# Patient Record
Sex: FEMALE | Employment: FULL TIME | URBAN - METROPOLITAN AREA
[De-identification: names, ages, dates, MRNs, and addresses within clinical notes are randomized per-mention and may not be internally consistent; named-entity substitution may affect disease eponyms.]

---
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
## 2023-01-15 ENCOUNTER — OFFICE VISIT (OUTPATIENT)
Dept: FAMILY MEDICINE | Facility: CLINIC | Age: 18
End: 2023-01-15
Payer: COMMERCIAL

## 2023-01-15 VITALS
RESPIRATION RATE: 16 BRPM | TEMPERATURE: 98.8 F | SYSTOLIC BLOOD PRESSURE: 97 MMHG | OXYGEN SATURATION: 99 % | BODY MASS INDEX: 22.33 KG/M2 | WEIGHT: 130.1 LBS | HEART RATE: 95 BPM | DIASTOLIC BLOOD PRESSURE: 65 MMHG

## 2023-01-15 DIAGNOSIS — H66.001 NON-RECURRENT ACUTE SUPPURATIVE OTITIS MEDIA OF RIGHT EAR WITHOUT SPONTANEOUS RUPTURE OF TYMPANIC MEMBRANE: Primary | ICD-10-CM

## 2023-01-15 DIAGNOSIS — R05.1 ACUTE COUGH: ICD-10-CM

## 2023-01-15 PROCEDURE — 99213 OFFICE O/P EST LOW 20 MIN: CPT | Mod: CS | Performed by: NURSE PRACTITIONER

## 2023-01-15 PROCEDURE — U0005 INFEC AGEN DETEC AMPLI PROBE: HCPCS | Performed by: NURSE PRACTITIONER

## 2023-01-15 PROCEDURE — U0003 INFECTIOUS AGENT DETECTION BY NUCLEIC ACID (DNA OR RNA); SEVERE ACUTE RESPIRATORY SYNDROME CORONAVIRUS 2 (SARS-COV-2) (CORONAVIRUS DISEASE [COVID-19]), AMPLIFIED PROBE TECHNIQUE, MAKING USE OF HIGH THROUGHPUT TECHNOLOGIES AS DESCRIBED BY CMS-2020-01-R: HCPCS | Performed by: NURSE PRACTITIONER

## 2023-01-15 RX ORDER — AMOXICILLIN 875 MG
875 TABLET ORAL 2 TIMES DAILY
Qty: 20 TABLET | Refills: 0 | Status: SHIPPED | OUTPATIENT
Start: 2023-01-15 | End: 2023-01-25

## 2023-01-15 NOTE — PROGRESS NOTES
Patient presents with:  Ear Problem: Woke up this morning to pain and ringing in R ear    Clinical Decision Making: Focused exam positive for right-sided bright red TM with cloudy fluid present, erythemic canal, significant nasal congestion bilaterally, breath sounds clear.  COVID PCR testing pending.    Clinical presentation medical decision making consistent with right ear infection with pending COVID test. Will treat with a course of oral antibiotics.  Encourage the use of consistent OTC Tylenol for relief of pain.  Encouraged increased rest and water hydration.  Discussed when to return for reevaluation symptoms or not improving as discussed, education provided, patient verbalized understanding.      ICD-10-CM    1. Non-recurrent acute suppurative otitis media of right ear without spontaneous rupture of tympanic membrane  H66.001 amoxicillin (AMOXIL) 875 MG tablet      2. Acute cough  R05.1 Symptomatic COVID-19 Virus (Coronavirus) by PCR Nose          Patient Instructions   Your COVID test will result to your MyChart in the next 24 to 48 hours.  Please start antibiotic as prescribed and consider taking extra strength Tylenol every 4 hours as discussed for pain.      HPI: Hoa Ames is a 17 year old female who presents today complaining of cough with rhinorrhea for the past 2 to 3 days, followed by awaking this morning with a sudden right ear pain along with ringing.  Patient reports pain is a 8-9/10 on the numeric pain scale.  Reports taking OTC acetaminophen with limited relief. Unsure of any ill contacts or positive COVID exposures.    History obtained from the patient.  LMP: 1/5/2022, approximately    Problem List:  2022-03: Infection due to 2019 novel coronavirus  2021-10: DNS (deviated nasal septum)  2021-10: Hypertrophy of nasal turbinates  2021-10: Nasal obstruction  2021-09: Stress reaction  2021-07: Depression, unspecified depression type  2021-07: Persistent insomnia  2021-05: Other  insomnia      History reviewed. No pertinent past medical history.    Social History     Tobacco Use     Smoking status: Former     Types: Cigarettes     Quit date: 2021     Years since quittin.0     Smokeless tobacco: Former   Substance Use Topics     Alcohol use: Not Currently     Comment: last drink years ago       Review of Systems   As noted in HPI    Vitals:    01/15/23 1350   BP: 97/65   Pulse: 95   Resp: 16   Temp: 98.8  F (37.1  C)   TempSrc: Oral   SpO2: 99%   Weight: 59 kg (130 lb 1.6 oz)       Physical Exam  Constitutional:       General: She is not in acute distress.     Appearance: She is ill-appearing. She is not diaphoretic.   HENT:      Head: Normocephalic and atraumatic.      Left Ear: Tympanic membrane, ear canal and external ear normal.      Ears:      Comments: Ri landmarks not visualized, ght-sided bright red TM with cloudy fluid present, erythemic canal     Nose: Congestion present. No rhinorrhea.      Mouth/Throat:      Mouth: Mucous membranes are moist.      Pharynx: No oropharyngeal exudate or posterior oropharyngeal erythema.   Cardiovascular:      Rate and Rhythm: Normal rate and regular rhythm.      Pulses: Normal pulses.      Heart sounds: Normal heart sounds.   Pulmonary:      Effort: Pulmonary effort is normal.      Breath sounds: Normal breath sounds.   Musculoskeletal:      Cervical back: Neck supple.   Lymphadenopathy:      Cervical: No cervical adenopathy.   Skin:     General: Skin is warm.      Capillary Refill: Capillary refill takes less than 2 seconds.      Findings: No rash.   Neurological:      Mental Status: She is alert and oriented to person, place, and time.   Psychiatric:         Behavior: Behavior normal.         Labs:  No results found for any visits on 01/15/23.      At the end of the encounter, I discussed results, diagnosis, medications. Discussed red flags for immediate return to clinic/ER, as well as indications for follow up if no improvement. Patient  understood and agreed to plan.     AJAY Michelle CNP

## 2023-01-15 NOTE — PATIENT INSTRUCTIONS
Your COVID test will result to your MyChart in the next 24 to 48 hours.  Please start antibiotic as prescribed and consider taking extra strength Tylenol every 4 hours as discussed for pain.

## 2023-01-16 ENCOUNTER — OFFICE VISIT (OUTPATIENT)
Dept: FAMILY MEDICINE | Facility: CLINIC | Age: 18
End: 2023-01-16
Payer: COMMERCIAL

## 2023-01-16 VITALS
SYSTOLIC BLOOD PRESSURE: 102 MMHG | TEMPERATURE: 98.1 F | WEIGHT: 137.4 LBS | OXYGEN SATURATION: 97 % | DIASTOLIC BLOOD PRESSURE: 68 MMHG | BODY MASS INDEX: 23.58 KG/M2 | RESPIRATION RATE: 16 BRPM | HEART RATE: 92 BPM

## 2023-01-16 DIAGNOSIS — H10.32 ACUTE BACTERIAL CONJUNCTIVITIS OF LEFT EYE: Primary | ICD-10-CM

## 2023-01-16 DIAGNOSIS — J06.9 UPPER RESPIRATORY TRACT INFECTION, UNSPECIFIED TYPE: ICD-10-CM

## 2023-01-16 PROBLEM — I73.00 RAYNAUD DISEASE: Status: ACTIVE | Noted: 2019-12-01

## 2023-01-16 PROBLEM — J34.3 HYPERTROPHY OF NASAL TURBINATES: Status: RESOLVED | Noted: 2021-10-01 | Resolved: 2023-01-16

## 2023-01-16 PROBLEM — F43.10 PTSD (POST-TRAUMATIC STRESS DISORDER): Status: ACTIVE | Noted: 2021-09-15

## 2023-01-16 PROBLEM — J34.89 NASAL OBSTRUCTION: Status: RESOLVED | Noted: 2021-10-01 | Resolved: 2023-01-16

## 2023-01-16 LAB — SARS-COV-2 RNA RESP QL NAA+PROBE: NEGATIVE

## 2023-01-16 PROCEDURE — 99213 OFFICE O/P EST LOW 20 MIN: CPT | Performed by: PHYSICIAN ASSISTANT

## 2023-01-16 RX ORDER — POLYMYXIN B SULFATE AND TRIMETHOPRIM 1; 10000 MG/ML; [USP'U]/ML
1 SOLUTION OPHTHALMIC EVERY 4 HOURS
Qty: 10 ML | Refills: 0 | Status: SHIPPED | OUTPATIENT
Start: 2023-01-16 | End: 2023-01-23

## 2023-01-16 RX ORDER — BENZONATATE 200 MG/1
200 CAPSULE ORAL 3 TIMES DAILY PRN
Qty: 30 CAPSULE | Refills: 0 | Status: SHIPPED | OUTPATIENT
Start: 2023-01-16 | End: 2023-03-01

## 2023-01-16 NOTE — PROGRESS NOTES
Assessment & Plan     Acute bacterial conjunctivitis of left eye  Add polytrim. Continue amoxil discussed warm compresses.  Continue fluids, rest, ibuprofen or tylenol for uri.  Added tessalon for cough. RTC for persistent or worsening sx.     - trimethoprim-polymyxin b (POLYTRIM) 67088-4.1 UNIT/ML-% ophthalmic solution  Dispense: 10 mL; Refill: 0  - benzonatate (TESSALON) 200 MG capsule  Dispense: 30 capsule; Refill: 0    Upper respiratory tract infection, unspecified type    - benzonatate (TESSALON) 200 MG capsule  Dispense: 30 capsule; Refill: 0         No follow-ups on file.    An Joseph PA-C  [facility]    Hugo Camara is a 17 year old female who presents to clinic today for the following health issues:  Chief Complaint   Patient presents with     Eye Problem     X yesterday. Lt eye. Itchy. Red. Burning. Watery.     HPI  Pt presents to urgent care with concerns re: L eye mattering, redness, irritation.  No photphobia, no vision change.  No pain with EOMS.  No fevers.  Mattered shut this am and continues to drain.  No R eye sx.    Was seen for uri, pending covid 19 test and R ear infection yesterday.  Treated with Amoxil.  Has taken 1 dose.        Review of Systems  Constitutional, HEENT, cardiovascular, pulmonary, gi and gu systems are negative, except as otherwise noted.      Objective    /68 (BP Location: Right arm, Patient Position: Sitting, Cuff Size: Adult Regular)   Pulse 92   Temp 98.1  F (36.7  C) (Oral)   Resp 16   Wt 62.3 kg (137 lb 6.4 oz)   SpO2 97%   BMI 23.58 kg/m    Physical Exam   Pt is in no acute distress and appears well  Conjunctiva injected on the L, purulent discharge on the L noted.  PERRLA, EOMI.    Ears patent B:  TM s intact, non-injected on the L.  R is injected and bulging.Nasal mucosa is non-edematous, no discharge.    Pharynx: non erythematous, tonsils non hypertrophied, No exudate   Neck supple: no adenopathy  Lungs:  CTA  Heart: RRR, no murmur, no thrills or heaves   Ext: no edema  Skin: no rashes

## 2023-01-22 ENCOUNTER — MYC MEDICAL ADVICE (OUTPATIENT)
Dept: FAMILY MEDICINE | Facility: CLINIC | Age: 18
End: 2023-01-22
Payer: COMMERCIAL

## 2023-01-24 NOTE — TELEPHONE ENCOUNTER
RN attempted to call patient to relay Dr. Rodriguez's message below.     Patient did not answer. Left message to patient to call the clinic back.       If patient calls back, please relay Dr. Rodriguez's message and assist patient to schedule an appointment.             Evita Segovia RN  [facility]

## 2023-01-24 NOTE — TELEPHONE ENCOUNTER
Patient returns call. Writer relays Dr. Rodriguez message as directed below to patient. Writer assisted to help patient schedule a virtual visit and patient declined to schedule appt. Patient state patient know that patient is lactose intolerance.

## 2023-01-31 ENCOUNTER — TELEPHONE (OUTPATIENT)
Dept: FAMILY MEDICINE | Facility: CLINIC | Age: 18
End: 2023-01-31
Payer: COMMERCIAL

## 2023-01-31 NOTE — TELEPHONE ENCOUNTER
General Call      Reason for Call:  Medical question    What are your questions or concerns:  PT called and not sure who can help her. Pt was seen twice in [facility] in care for being sick with fever and etc. PCP is no longer here and pt have not est. care with any provider either and is aware to est. Care. Pt has not been to work a few time due to her being sick and her workplace needs a proper diagnose to prove pt is sick. Please call and advise, thanks! Routing to [facility] as PCP no longer here.    Date of last appointment with provider: 11/23/2022    Could we send this information to you in [facility] or would you prefer to receive a phone call?:   Patient would prefer a phone call   Okay to leave a detailed message?: Yes at Home number on file [phone] (home)

## 2023-02-01 ENCOUNTER — NURSE TRIAGE (OUTPATIENT)
Dept: NURSING | Facility: CLINIC | Age: 18
End: 2023-02-01
Payer: COMMERCIAL

## 2023-02-01 NOTE — TELEPHONE ENCOUNTER
RN attempted to contact patient, but no answer. Left message on patient's voice mail to call clinic back.    If patient calls back, pleaserelay message below from Dr Zhang. Thanks    Sharonda Moreno RN  [facility]    Dr Zhang  Looks like her last diagnosis on 1/15/23 was otitis media. Not sure what else is the question.

## 2023-02-01 NOTE — TELEPHONE ENCOUNTER
TRIAGE CALL - Is this a 2nd Level Triage? No  Nurse Triage SBAR - Patient  Returning the call    Situation:  Symptoms since last year without a Dx    Dr Zhang: Looks like her last diagnosis on 1/15/23 was otitis media. Not sure what else is the question.  Background:    Sharon Maldonado DO -Family Medicine - was sent to specialist last year   Has been a year of getting sick every month - fever, fatigue, body aches, or vomiting   She wast t know what is going on with her and specialist last year did not help  Last COVID Neg 2 days ago   Assessment:  Symptoms happens once a karthikeyan, currently feeling  She is fatigue  X 1 vomited   Slight headache  Coughing  Temp:   Patient is able to speak in full long sentences without getting short of breath.  Sharon Maldonado DO is not longer at [facility]  Recommended Disposition per protocol: f/ up with a new provider  Transferred to scheduling   Caller encouraged to continue to monitor symptoms, and to watch for worsening or not responding to measures taken. Call back nurse line with any concerns, line available 24/7.  Patient verbalized understanding and agrees with plan    Armida Jackson RN [place] Nurse Advisor,  6:10 PM 2/1/2023

## 2023-02-14 ENCOUNTER — OFFICE VISIT (OUTPATIENT)
Dept: FAMILY MEDICINE | Facility: CLINIC | Age: 18
End: 2023-02-14
Payer: COMMERCIAL

## 2023-02-14 ENCOUNTER — NURSE TRIAGE (OUTPATIENT)
Dept: NURSING | Facility: CLINIC | Age: 18
End: 2023-02-14
Payer: COMMERCIAL

## 2023-02-14 VITALS
WEIGHT: 133.2 LBS | DIASTOLIC BLOOD PRESSURE: 71 MMHG | TEMPERATURE: 98.6 F | BODY MASS INDEX: 22.86 KG/M2 | HEART RATE: 77 BPM | OXYGEN SATURATION: 98 % | RESPIRATION RATE: 16 BRPM | SYSTOLIC BLOOD PRESSURE: 111 MMHG

## 2023-02-14 DIAGNOSIS — D72.828 NEUTROPHILIA: ICD-10-CM

## 2023-02-14 DIAGNOSIS — K11.1 PAROTID GLAND ENLARGEMENT: Primary | ICD-10-CM

## 2023-02-14 LAB
BASOPHILS # BLD AUTO: 0 10E3/UL (ref 0–0.2)
BASOPHILS NFR BLD AUTO: 0 %
EOSINOPHIL # BLD AUTO: 0 10E3/UL (ref 0–0.7)
EOSINOPHIL NFR BLD AUTO: 0 %
ERYTHROCYTE [DISTWIDTH] IN BLOOD BY AUTOMATED COUNT: 13.5 % (ref 10–15)
HCT VFR BLD AUTO: 35.4 % (ref 35–47)
HGB BLD-MCNC: 11.7 G/DL (ref 11.7–15.7)
IMM GRANULOCYTES # BLD: 0 10E3/UL
IMM GRANULOCYTES NFR BLD: 0 %
LYMPHOCYTES # BLD AUTO: 1.4 10E3/UL (ref 0.8–5.3)
LYMPHOCYTES NFR BLD AUTO: 12 %
MCH RBC QN AUTO: 28.9 PG (ref 26.5–33)
MCHC RBC AUTO-ENTMCNC: 33.1 G/DL (ref 31.5–36.5)
MCV RBC AUTO: 87 FL (ref 78–100)
MONOCYTES # BLD AUTO: 0.9 10E3/UL (ref 0–1.3)
MONOCYTES NFR BLD AUTO: 7 %
NEUTROPHILS # BLD AUTO: 9.8 10E3/UL (ref 1.6–8.3)
NEUTROPHILS NFR BLD AUTO: 81 %
PLATELET # BLD AUTO: 316 10E3/UL (ref 150–450)
RBC # BLD AUTO: 4.05 10E6/UL (ref 3.8–5.2)
WBC # BLD AUTO: 12.1 10E3/UL (ref 4–11)

## 2023-02-14 PROCEDURE — 85025 COMPLETE CBC W/AUTO DIFF WBC: CPT | Performed by: PHYSICIAN ASSISTANT

## 2023-02-14 PROCEDURE — 99213 OFFICE O/P EST LOW 20 MIN: CPT | Performed by: PHYSICIAN ASSISTANT

## 2023-02-14 PROCEDURE — 36415 COLL VENOUS BLD VENIPUNCTURE: CPT | Performed by: PHYSICIAN ASSISTANT

## 2023-02-14 RX ORDER — NAPROXEN 500 MG/1
500 TABLET ORAL
Qty: 21 TABLET | Refills: 0 | Status: SHIPPED | OUTPATIENT
Start: 2023-02-14 | End: 2023-03-31

## 2023-02-14 RX ORDER — LEVOFLOXACIN 500 MG/1
500 TABLET, FILM COATED ORAL DAILY
Qty: 10 TABLET | Refills: 0 | Status: SHIPPED | OUTPATIENT
Start: 2023-02-14 | End: 2023-02-24

## 2023-02-14 NOTE — TELEPHONE ENCOUNTER
"Last night the right side of her jaw was hurting. Patient woke up this morning and the right side of her face is not even with the left side of her face. There is numbness on the right side of her face. Patient states that the right side of her face by her jaw and right under her ear is swollen and painful. Patient states that it is more painful than numb.   \"Right side of her jaw is resisting opening and just want to close.\" Patient is able to smile and raise eyebrows evenly.   No difficulty breathing or swallowing. Denies allergic reaction.  Patient is icing her face.  Protocol recommends go to office now.  Care advice given. Patient will present to [facility] now  Sary Lakhani RN   02/14/23 12:22 PM  [facility] Nurse Advisor        Reason for Disposition    Face swelling is painful to touch    Additional Information    Negative: Life-threatening reaction (anaphylaxis) in the past to similar substance (e.g., food, insect bite/sting, chemical, etc.) and < 2 hours since exposure    Negative: Unresponsive, passed out or very weak    Negative: Swollen tongue    Negative: Difficulty breathing or wheezing    Negative: Sounds like a life-threatening emergency to the triager    Negative: Followed an injury to face    Negative: Bee sting and within last 24 hours    Negative: Mosquito bite suspected    Negative: Insect bite suspected    Negative: SEVERE swelling of entire face and < 2 hours since exposure to high-risk allergen (e.g., peanuts, tree nuts, fish, shellfish or 1st dose of drug) and no serious symptoms AND [4] no serious allergic reaction in the past    Negative: Pregnant > 20 weeks    Negative: Postpartum (from 0 to 6 weeks after delivery)    Negative: Fever    Negative: Taking an ACE Inhibitor medication  (e.g., benazepril/LOTENSIN, captopril/CAPOTEN, enalapril/VASOTEC, lisinopril/ZESTRIL)    Negative: Patient sounds very sick or weak to the triager    Negative: SEVERE swelling of the entire face    " Negative: Face swelling began after taking a drug    Negative: Looks infected (e.g., spreading redness, pus)    Negative: Looks like a boil or infected sore    Negative: Painful rash with multiple small blisters grouped together (i.e., dermatomal distribution or 'band' or 'stripe')    Negative: Swelling of both lower legs (i.e., bilateral pedal edema)    Negative: Widespread rash on body    Protocols used: FACE SWELLING-A-OH

## 2023-02-14 NOTE — TELEPHONE ENCOUNTER
transferred call but Pt disconnected before transfer complete.    Triage attempted to call Pt back twice, no answer.    Latia Rehman RN, Nurse Advisor 12:07 PM 2/14/2023

## 2023-02-14 NOTE — PROGRESS NOTES
Parotid gland enlargement  - naproxen (NAPROSYN) 500 MG tablet; Take 1 tablet (500 mg) by mouth 3 times daily (with meals) for 7 days  - CBC with platelets and differential; Future    Encourage plenty of fluids. Encourage saliva production with charley candy.     Rest the affected area as much as possible.  Apply ice for 15-20 minutes intermittently as needed and especially after any offending activity.     Okay to take acetaminophen 500 mg- 2 tabs (Total of 1000 mg) every 8 hrs     Patient was advised to return to clinic if symptoms do not improve in the amount of time specified in the AVS or if symptoms worsen. Patient educated on red flag symptoms and asked to go directly to the ED if symptoms present themselves.    Addendum: Patient's CBC returned with slightly elevated WBC and Neutrophil count. I'd like the patient to start Levoquin today. I've called the patient t notify them.      IVETTE Perez [facility]    Subjective   18 year old who presents to clinic today for the following health issues:    Facial Pain       HPI     Rt face pain donita began yesterday. Slight tingling present. Pain upon touch. Swelling under the right jaw near the base of the ear. Denies injury. Denies any fevers, chills, or body aches. Denies sore throat, cough, congestion, or runny nose. Denies hearing loss or ringing in the ear.     Review of Systems   Review of Systems   See HPI    Objective    Temp: 98.6  F (37  C) Temp src: Oral BP: 111/71 Pulse: 77   Resp: 16 SpO2: 98 %       Physical Exam   Physical Exam  Constitutional:       General: She is not in acute distress.     Appearance: Normal appearance. She is normal weight. She is not ill-appearing, toxic-appearing or diaphoretic.   HENT:      Head: Normocephalic and atraumatic.        Comments: Swelling and tenderness in the area shown above without erythema or warmth.  No cervical lymphadenopathy.  TMJs normal and without pain.     Right Ear: Tympanic  membrane, ear canal and external ear normal. There is no impacted cerumen.      Left Ear: Tympanic membrane, ear canal and external ear normal. There is no impacted cerumen.      Mouth/Throat:      Mouth: Mucous membranes are moist.      Pharynx: Oropharynx is clear. No oropharyngeal exudate or posterior oropharyngeal erythema.   Neurological:      General: No focal deficit present.      Mental Status: She is alert and oriented to person, place, and time. Mental status is at baseline.      Gait: Gait normal.   Psychiatric:         Mood and Affect: Mood normal.         Behavior: Behavior normal.         Thought Content: Thought content normal.         Judgment: Judgment normal.          No results found for this or any previous visit (from the past 24 hour(s)).

## 2023-02-24 ENCOUNTER — NURSE TRIAGE (OUTPATIENT)
Dept: NURSING | Facility: CLINIC | Age: 18
End: 2023-02-24
Payer: COMMERCIAL

## 2023-02-24 NOTE — TELEPHONE ENCOUNTER
Nurse Triage SBAR    Is this a 2nd Level Triage? YES, LICENSED PRACTITIONER REVIEW IS REQUIRED    Situation: Patient calling with ongoing abdominal pain .  Consent: not needed    Background: Pt states ever morning she has been waking up with a  Burning sensation in her stomach area.   States it feels like she did an ab workout even though she didn't.      States this has been going on since last   States the pain increased today which is why she called.     Assessment:   * Pain located:  Below below and to the sides of her bellybutton.   Radiation:  Denies it radiating to chest or back but states it does go above her belly button too.    * Began in 2022.    * Onset was sudden.    * Pain is only present in the morning for a short time and then it goes away within 5-10 minutes. .  Today is different because the pain has not yet gone away. She has had pain for 40 minutes.    Pain Ratin/10   Pt has had this almost every morning since last  ()  Unsure what could be causing this.    Denies chance of pregnancy.   Denies any other sx.        [facility]ocol Recommended Disposition:   Seen in office today.     Recommendation: Advised patient to make an appointment. Transferred to scheduling. . Reviewed concerning symptoms and when to call back.   Pt disconnected the phone call while connecting with scheduling.   states she will call pt back.    Writer did advise pt to be seen in UC today if no same day appts were available at [facility].     Teresa Vásquez RN [place] [facility] 2023 9:37 AM                              Reason for Disposition    MODERATE pain (e.g., interferes with normal activities that comes and goes (cramps) lasts > 24 hours  (Exception: Pain with Vomiting or Diarrhea - see that Protocol.)    Additional Information    Negative: Passed out (i.e., fainted, collapsed and was not responding)    Negative: Shock suspected (e.g., cold/pale/clammy skin, too weak to  stand, low BP, rapid pulse)    Negative: Sounds like a life-threatening emergency to the triager    Negative: Chest pain    Negative: Pain is mainly in upper abdomen (if needed ask: 'is it mainly above the belly button?')    Negative: Abdominal pain and pregnant < 20 weeks    Negative: Abdominal pain and pregnant 20 or more weeks    Negative: SEVERE abdominal pain (e.g., excruciating)    Negative: Vomiting red blood or black (coffee ground) material    Negative: Bloody, black, or tarry bowel movements  (Exception: Chronic-unchanged black-grey bowel movements and is taking iron pills or Pepto-Bismol.)    Negative: Constant abdominal pain lasting > 2 hours    Negative: Vomiting bile (green color)    Negative: Patient sounds very sick or weak to the triager    Negative: Vomiting and abdomen looks much more swollen than usual    Negative: White of the eyes have turned yellow (i.e., jaundice)    Negative: Blood in urine (red, pink, or tea-colored)    Negative: Fever > 103 F (39.4 C)    Negative: Fever > 101 F (38.3 C) and over 60 years of age    Negative: Fever > 100.0 F (37.8 C) and has diabetes mellitus or a weak immune system (e.g., HIV positive, cancer chemotherapy, organ transplant, splenectomy, chronic steroids)    Negative: Fever > 100.0 F (37.8 C) and bedridden (e.g., nursing home patient, stroke, chronic illness, recovering from surgery)    Negative: Pregnant or could be pregnant (i.e., missed last menstrual period)    Protocols used: ABDOMINAL PAIN - FEMALE-A-[place]

## 2023-02-27 ENCOUNTER — OFFICE VISIT (OUTPATIENT)
Dept: FAMILY MEDICINE | Facility: CLINIC | Age: 18
End: 2023-02-27
Payer: COMMERCIAL

## 2023-02-27 VITALS
DIASTOLIC BLOOD PRESSURE: 69 MMHG | TEMPERATURE: 98.2 F | OXYGEN SATURATION: 99 % | SYSTOLIC BLOOD PRESSURE: 106 MMHG | BODY MASS INDEX: 23.17 KG/M2 | WEIGHT: 135 LBS | HEART RATE: 89 BPM | RESPIRATION RATE: 14 BRPM

## 2023-02-27 DIAGNOSIS — F33.0 MILD EPISODE OF RECURRENT MAJOR DEPRESSIVE DISORDER (H): Primary | ICD-10-CM

## 2023-02-27 DIAGNOSIS — D70.9 NEUTROPENIA, UNSPECIFIED TYPE (H): ICD-10-CM

## 2023-02-27 DIAGNOSIS — M24.676: ICD-10-CM

## 2023-02-27 DIAGNOSIS — R94.31 SHORTENED PR INTERVAL: ICD-10-CM

## 2023-02-27 DIAGNOSIS — A68.9 RECURRENT FEVER: ICD-10-CM

## 2023-02-27 PROBLEM — F32.A DEPRESSION, UNSPECIFIED DEPRESSION TYPE: Status: RESOLVED | Noted: 2021-07-01 | Resolved: 2023-02-27

## 2023-02-27 PROBLEM — J34.2 DNS (DEVIATED NASAL SEPTUM): Status: RESOLVED | Noted: 2021-10-01 | Resolved: 2023-02-27

## 2023-02-27 LAB
ALBUMIN SERPL BCG-MCNC: 4.1 G/DL (ref 3.5–5.2)
ALBUMIN UR-MCNC: NEGATIVE MG/DL
ALP SERPL-CCNC: 61 U/L (ref 35–104)
ALT SERPL W P-5'-P-CCNC: 11 U/L (ref 10–35)
ANION GAP SERPL CALCULATED.3IONS-SCNC: 9 MMOL/L (ref 7–15)
APPEARANCE UR: CLEAR
AST SERPL W P-5'-P-CCNC: 28 U/L (ref 10–35)
ATRIAL RATE - MUSE: 67 BPM
BACTERIA #/AREA URNS HPF: ABNORMAL /HPF
BASOPHILS # BLD AUTO: 0 10E3/UL (ref 0–0.2)
BASOPHILS NFR BLD AUTO: 1 %
BILIRUB SERPL-MCNC: 0.4 MG/DL
BILIRUB UR QL STRIP: NEGATIVE
BUN SERPL-MCNC: 14.9 MG/DL (ref 6–20)
CALCIUM SERPL-MCNC: 9.5 MG/DL (ref 8.6–10)
CHLORIDE SERPL-SCNC: 108 MMOL/L (ref 98–107)
COLOR UR AUTO: YELLOW
CREAT SERPL-MCNC: 0.55 MG/DL (ref 0.51–0.95)
CRP SERPL-MCNC: <3 MG/L
DEPRECATED HCO3 PLAS-SCNC: 23 MMOL/L (ref 22–29)
DIASTOLIC BLOOD PRESSURE - MUSE: NORMAL MMHG
EOSINOPHIL # BLD AUTO: 0 10E3/UL (ref 0–0.7)
EOSINOPHIL NFR BLD AUTO: 1 %
ERYTHROCYTE [DISTWIDTH] IN BLOOD BY AUTOMATED COUNT: 13.4 % (ref 10–15)
ERYTHROCYTE [SEDIMENTATION RATE] IN BLOOD BY WESTERGREN METHOD: 31 MM/HR (ref 0–20)
GFR SERPL CREATININE-BSD FRML MDRD: >90 ML/MIN/1.73M2
GLUCOSE SERPL-MCNC: 81 MG/DL (ref 70–99)
GLUCOSE UR STRIP-MCNC: NEGATIVE MG/DL
HCT VFR BLD AUTO: 36.6 % (ref 35–47)
HGB BLD-MCNC: 11.9 G/DL (ref 11.7–15.7)
HGB UR QL STRIP: NEGATIVE
IMM GRANULOCYTES # BLD: 0 10E3/UL
IMM GRANULOCYTES NFR BLD: 0 %
INTERPRETATION ECG - MUSE: NORMAL
KETONES UR STRIP-MCNC: NEGATIVE MG/DL
LEUKOCYTE ESTERASE UR QL STRIP: NEGATIVE
LYMPHOCYTES # BLD AUTO: 1.2 10E3/UL (ref 0.8–5.3)
LYMPHOCYTES NFR BLD AUTO: 40 %
MCH RBC QN AUTO: 28.3 PG (ref 26.5–33)
MCHC RBC AUTO-ENTMCNC: 32.5 G/DL (ref 31.5–36.5)
MCV RBC AUTO: 87 FL (ref 78–100)
MONOCYTES # BLD AUTO: 0.3 10E3/UL (ref 0–1.3)
MONOCYTES NFR BLD AUTO: 10 %
NEUTROPHILS # BLD AUTO: 1.4 10E3/UL (ref 1.6–8.3)
NEUTROPHILS NFR BLD AUTO: 48 %
NITRATE UR QL: NEGATIVE
P AXIS - MUSE: 33 DEGREES
PH UR STRIP: 7 [PH] (ref 5–8)
PLATELET # BLD AUTO: 283 10E3/UL (ref 150–450)
POTASSIUM SERPL-SCNC: 4.1 MMOL/L (ref 3.4–5.3)
PR INTERVAL - MUSE: 106 MS
PROT SERPL-MCNC: 7.6 G/DL (ref 6.4–8.3)
QRS DURATION - MUSE: 76 MS
QT - MUSE: 394 MS
QTC - MUSE: 416 MS
R AXIS - MUSE: 74 DEGREES
RBC # BLD AUTO: 4.2 10E6/UL (ref 3.8–5.2)
RBC #/AREA URNS AUTO: ABNORMAL /HPF
SODIUM SERPL-SCNC: 140 MMOL/L (ref 136–145)
SP GR UR STRIP: 1.02 (ref 1–1.03)
SQUAMOUS #/AREA URNS AUTO: ABNORMAL /LPF
SYSTOLIC BLOOD PRESSURE - MUSE: NORMAL MMHG
T AXIS - MUSE: 45 DEGREES
UROBILINOGEN UR STRIP-ACNC: 0.2 E.U./DL
VENTRICULAR RATE- MUSE: 67 BPM
WBC # BLD AUTO: 2.9 10E3/UL (ref 4–11)
WBC #/AREA URNS AUTO: ABNORMAL /HPF

## 2023-02-27 PROCEDURE — 85730 THROMBOPLASTIN TIME PARTIAL: CPT | Performed by: FAMILY MEDICINE

## 2023-02-27 PROCEDURE — 36415 COLL VENOUS BLD VENIPUNCTURE: CPT | Performed by: FAMILY MEDICINE

## 2023-02-27 PROCEDURE — 93010 ELECTROCARDIOGRAM REPORT: CPT | Performed by: INTERNAL MEDICINE

## 2023-02-27 PROCEDURE — 85610 PROTHROMBIN TIME: CPT | Performed by: FAMILY MEDICINE

## 2023-02-27 PROCEDURE — 81001 URINALYSIS AUTO W/SCOPE: CPT | Performed by: FAMILY MEDICINE

## 2023-02-27 PROCEDURE — 86140 C-REACTIVE PROTEIN: CPT | Performed by: FAMILY MEDICINE

## 2023-02-27 PROCEDURE — 99214 OFFICE O/P EST MOD 30 MIN: CPT | Performed by: FAMILY MEDICINE

## 2023-02-27 PROCEDURE — 85652 RBC SED RATE AUTOMATED: CPT | Performed by: FAMILY MEDICINE

## 2023-02-27 PROCEDURE — 87798 DETECT AGENT NOS DNA AMP: CPT | Mod: 90 | Performed by: FAMILY MEDICINE

## 2023-02-27 PROCEDURE — 86431 RHEUMATOID FACTOR QUANT: CPT | Performed by: FAMILY MEDICINE

## 2023-02-27 PROCEDURE — 86225 DNA ANTIBODY NATIVE: CPT | Performed by: FAMILY MEDICINE

## 2023-02-27 PROCEDURE — 87484 EHRLICHA CHAFFEENSIS AMP PRB: CPT | Mod: 90 | Performed by: FAMILY MEDICINE

## 2023-02-27 PROCEDURE — 99000 SPECIMEN HANDLING OFFICE-LAB: CPT | Performed by: FAMILY MEDICINE

## 2023-02-27 PROCEDURE — 86160 COMPLEMENT ANTIGEN: CPT | Performed by: FAMILY MEDICINE

## 2023-02-27 PROCEDURE — 86038 ANTINUCLEAR ANTIBODIES: CPT | Performed by: FAMILY MEDICINE

## 2023-02-27 PROCEDURE — 93005 ELECTROCARDIOGRAM TRACING: CPT | Performed by: FAMILY MEDICINE

## 2023-02-27 PROCEDURE — 86618 LYME DISEASE ANTIBODY: CPT | Performed by: FAMILY MEDICINE

## 2023-02-27 PROCEDURE — 80053 COMPREHEN METABOLIC PANEL: CPT | Performed by: FAMILY MEDICINE

## 2023-02-27 PROCEDURE — 85025 COMPLETE CBC W/AUTO DIFF WBC: CPT | Performed by: FAMILY MEDICINE

## 2023-02-27 PROCEDURE — 87468 ANAPLSMA PHGCYTOPHLM AMP PRB: CPT | Mod: 90 | Performed by: FAMILY MEDICINE

## 2023-02-27 ASSESSMENT — PATIENT HEALTH QUESTIONNAIRE - PHQ9
SUM OF ALL RESPONSES TO PHQ QUESTIONS 1-9: 0
SUM OF ALL RESPONSES TO PHQ QUESTIONS 1-9: 0
10. IF YOU CHECKED OFF ANY PROBLEMS, HOW DIFFICULT HAVE THESE PROBLEMS MADE IT FOR YOU TO DO YOUR WORK, TAKE CARE OF THINGS AT HOME, OR GET ALONG WITH OTHER PEOPLE: NOT DIFFICULT AT ALL

## 2023-02-27 NOTE — PROGRESS NOTES
[facility]  Phone : none    Assessment/Plan:  Mild episode of recurrent major depressive disorder (H)  Pt notes she is doing okay- just wants to feel better and stop having fevers    Recurrent fever  Unclear cause. Pt notes that at least once a month for several days she will have a fever over 100 and feel terrible.  Workup so far for different symptoms has not shown any obvious issues.  Will continue workup for recurrent fevers including Rheumatologic/SLE, infectious, malignancy, etc.    - REVIEW OF HEALTH MAINTENANCE PROTOCOL ORDERS  - ESR: Erythrocyte sedimentation rate  - CRP, inflammation  - Anti Nuclear Fatimah IgG by IFA with Reflex  - CBC with platelets and differential  - Comprehensive metabolic panel (BMP + Alb, Alk Phos, ALT, AST, Total. Bili, TP)  - INR  - Partial thromboplastin time  - EKG 12-lead, tracing only  - UA with Microscopic reflex to Culture - Clinic Collect  - Lyme Disease Total Abs Bld with Reflex to Confirm CLIA  - Ehrlichia Anaplasma Sp by PCR  - ESR: Erythrocyte sedimentation rate  - CRP, inflammation  - Anti Nuclear Fatmiah IgG by IFA with Reflex  - CBC with platelets and differential  - Comprehensive metabolic panel (BMP + Alb, Alk Phos, ALT, AST, Total. Bili, TP)  - INR  - Partial thromboplastin time  - Lyme Disease Total Abs Bld with Reflex to Confirm CLIA  - Ehrlichia Anaplasma Sp by PCR  - Urine Microscopic  - DNA double stranded antibodies  - Lupus Anticoagulant Panel  - Complement C3  - Complement C4  - Rheumatoid factor    Shortened OK interval  Continue workup as above.      Neutropenia, unspecified type (H)  - Lab Blood Morphology Pathologist Review      No follow-ups on file.        Hugo Camara is a 18 year old accompanied by her self, presenting for the following health issues:  reocurring sicknes (For the last year has a reoccurring cold that consistent of fever, fatigue and vomiting. Negative COVID test at beginning of February.  )    Since Jan2022 has been getting sick every month.    Has been to ER many times  Did follow-up with GI as scheduled- upper endoscopy was normal.   Has been calling in to work sick a lot- would like a diagnosis for something so she doesn't have to get letters for work when she is feeling sick.  Doesn't want to get fired.      Can't go to work sometimes due to fever 100-104.  Getting this every month for 4 days in a row.  No one around her is getting sick- doesn't seem to be contagious. Has mold exposure. Moldy food in home she lived in. Has been living in her mom's basement and coughing a lot but when she would stay at friends house her cough would get better.      Burning sensation in her abd in the morning.      Weight loss- has lost about 15lb in past year.      Not sick in the past week.      Had covid 1/6/2022.    Influenza 2/2022   Has not gotten flu shot this season.       Labs 3/2022 reviewed wnl  Labs 5/2022 reviewed wnl. ua with protein   Labs 6/2022 no proteinura  7/2022 [facility]- no h pylori  9/2022 [facility] liver tests wnl.   11/2023 neg STI screening  1/2023 no covid- AOM, conjunctivitis,   2/2023 WBC 12.1    History of Present Illness       Reason for visit:  I need a diegnosis for what i have kiesha been sick for over a year and i cant just keep calling in every month and expecting my employer to belive me when i dont have a single diegnosis for this condition im in    She eats 2-3 servings of fruits and vegetables daily.She consumes 2 sweetened beverage(s) daily.She exercises with enough effort to increase her heart rate 60 or more minutes per day.  She exercises with enough effort to increase her heart rate 5 days per week.     Today's PHQ-9         PHQ-9 Total Score: 0    PHQ-9 Q9 Thoughts of better off dead/self-harm past 2 weeks :   Not at all    How difficult have these problems made it for you to do your work, take care of things at home, or get along with other people: Not difficult at all          Review of Systems       Objective    /69 (BP Location: Right arm, Patient Position: Sitting, Cuff Size: Adult Regular)   Pulse 89   Temp 98.2  F (36.8  C) (Temporal)   Resp 14   Wt 61.2 kg (135 lb)   LMP 02/26/2023   SpO2 99%   BMI 23.17 kg/m    Body mass index is 23.17 kg/m .  Physical Exam   Wt Readings from Last 3 Encounters:   02/27/23 61.2 kg (135 lb)   02/14/23 60.4 kg (133 lb 3.2 oz)   01/16/23 62.3 kg (137 lb 6.4 oz) (65 %, Z= 0.39)*     * Growth percentiles are based on CDC (Girls, 2-20 Years) data.     Weight 1/24/22- 149lb    Patient's last menstrual period was 02/26/2023.    All normal as below except abnormalities include: pt appears tired and anxious about her symptoms and wanting to get better.   General is a  18 year old sitting comfortably in no apparent distress wearing a mask   HEENT:  TM are clear bilaterally.  Eye exams within normal   Neck: Supple without lymphadenopathy or thyromegally  CV: Regular rate and rhythm S1S2 without rubs, murmurs or gallops,   Lungs: Clear to auscultation bilaterally  Abd:  +BS, soft NT/ND,  No masses or organomegally,   Extremities: Warm, No Edema, 2+ Pedal and radial pulses bilaterally  Skin: No lesions or rashes noted  Neuro: Able to ambulate around the exam room with equal movement, strength and normal coordination of the upper and lower extremeties symmetrically    History summarized from1-2:many clinic and ER visits over past year reviewed  Old Records-1: Outside allergies, meds, problems and immunizations were reconciled as needed from [facility]  Radiology tests reviewed-1: normal pelvic us 9/2022,   Abd/pelvic CT 9/2022- stool/constipation. Benign 1.7cm ovarian cyst and 2cm left ovarian cyst.    8/2022 normal abd us.   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   Lab tests reviewed-1: 3502-5116  Upper GI 8/2022- normal     Varsha Garcia MD

## 2023-02-27 NOTE — LETTER
My Depression Action Plan  Name: Hoa Ames   Date of Birth 2005  Date: 2/27/2023    My doctor: Sharon Maldonado   My clinic: [facility] [address] [place] [id number] [phone]          GREEN    ZONE   Good Control    What it looks like:     Things are going generally well. You have normal ups and downs. You may even feel depressed from time to time, but bad moods usually last less than a day.   What you need to do:  1. Continue to care for yourself (see self care plan)  2. Check your depression survival kit and update it as needed  3. Follow your physician s recommendations including any medication.  4. Do not stop taking medication unless you consult with your physician first.           YELLOW         ZONE Getting Worse    What it looks like:     Depression is starting to interfere with your life.     It may be hard to get out of bed; you may be starting to isolate yourself from others.    Symptoms of depression are starting to last most all day and this has happened for several days.     You may have suicidal thoughts but they are not constant.   What you need to do:     1. Call your care team. Your response to treatment will improve if you keep your care team informed of your progress. Yellow periods are signs an adjustment may need to be made.     2. Continue your self-care.  Just get dressed and ready for the day.  Don't give yourself time to talk yourself out of it.    3. Talk to someone in your support network.    4. Open up your Depression Self-Care Plan/Wellness Kit.           RED    ZONE Medical Alert - Get Help    What it looks like:     Depression is seriously interfering with your life.     You may experience these or other symptoms: You can t get out of bed most days, can t work or engage in other necessary activities, you have trouble taking care of basic hygiene, or basic responsibilities, thoughts of suicide or death that will not go  away, self-injurious behavior.     What you need to do:  1. Call your care team and request a same-day appointment. If they are not available (weekends or after hours) call your local crisis line, emergency room or 911.          Depression Self-Care Plan / Wellness Kit    Many people find that medication and therapy are helpful treatments for managing depression. In addition, making small changes to your everyday life can help to boost your mood and improve your wellbeing. Below are some tips for you to consider. Be sure to talk with your medical provider and/or behavioral health consultant if your symptoms are worsening or not improving.     Sleep   Sleep hygiene  means all of the habits that support good, restful sleep. It includes maintaining a consistent bedtime and wake time, using your bedroom only for sleeping or sex, and keeping the bedroom dark and free of distractions like a computer, smartphone, or television.     Develop a Healthy Routine  Maintain good hygiene. Get out of bed in the morning, make your bed, brush your teeth, take a shower, and get dressed. Don t spend too much time viewing media that makes you feel stressed. Find time to relax each day.    Exercise  Get some form of exercise every day. This will help reduce pain and release endorphins, the  feel good  chemicals in your brain. It can be as simple as just going for a walk or doing some gardening, anything that will get you moving.      Diet  Strive to eat healthy foods, including fruits and vegetables. Drink plenty of water. Avoid excessive sugar, caffeine, alcohol, and other mood-altering substances.     Stay Connected with Others  Stay in touch with friends and family members.    Manage Your Mood  Try deep breathing, massage therapy, biofeedback, or meditation. Take part in fun activities when you can. Try to find something to smile about each day.     Psychotherapy  Be open to working with a therapist if your provider recommends it.      Medication  Be sure to take your medication as prescribed. Most anti-depressants need to be taken every day. It usually takes several weeks for medications to work. Not all medicines work for all people. It is important to follow-up with your provider to make sure you have a treatment plan that is working for you. Do not stop your medication abruptly without first discussing it with your provider.    Crisis Resources   These hotlines are for both adults and children. They and are open 24 hours a day, 7 days a week unless noted otherwise.      National Suicide Prevention Lifeline   988 or 8-061-974-IUJC (7539)      Crisis Text Line    www.crisistextline.org  Text HOME to 636934 from anywhere in the [place], anytime, about any type of crisis. A live, trained crisis counselor will receive the text and respond quickly.      [place] Lifeline for LGBTQ Youth  A national crisis intervention and suicide lifeline for LGBTQ youth under 25. Provides a safe place to talk without judgement. Call 1-206.585.8949; text START to 573575 or visit www.thetrevorproject.org to talk to a trained counselor.      For [facility] crisis numbers, visit the [facility] website at:  https://[place].gov/dhs/people-we-serve/adults/health-care/mental-health/resources/crisis-contacts.jsp

## 2023-02-27 NOTE — LETTER
23    RE: Hoa Ames  : 2005    To Whom It May Concern:    Hoa Ames has had recurrent illness over past year.    We are currently working on diagnosing her.    Please excuse from work as needed.      Please contact me with any questions.     Sincerely,      Electronically signed by:  Varsha Garcia MD

## 2023-02-28 LAB
ANA PAT SER IF-IMP: ABNORMAL
ANA SER QL IF: POSITIVE
ANA TITR SER IF: ABNORMAL {TITER}
APTT PPP: 35 SECONDS (ref 22–38)
B BURGDOR IGG+IGM SER QL: 0.22
INR PPP: 1.02 (ref 0.85–1.15)

## 2023-03-01 PROBLEM — M24.676: Status: ACTIVE | Noted: 2023-03-01

## 2023-03-01 PROBLEM — D70.9 NEUTROPENIA (H): Status: ACTIVE | Noted: 2023-03-01

## 2023-03-01 PROBLEM — R94.31 SHORTENED PR INTERVAL: Status: ACTIVE | Noted: 2023-03-01

## 2023-03-02 DIAGNOSIS — R76.8 ELEVATED RHEUMATOID FACTOR: ICD-10-CM

## 2023-03-02 DIAGNOSIS — A68.9 RECURRENT FEVER: Primary | ICD-10-CM

## 2023-03-02 DIAGNOSIS — D70.9 NEUTROPENIA, UNSPECIFIED TYPE (H): ICD-10-CM

## 2023-03-02 DIAGNOSIS — R76.8 ELEVATED ANTINUCLEAR ANTIBODY (ANA) LEVEL: ICD-10-CM

## 2023-03-02 LAB
A PHAGOCYTOPH DNA BLD QL NAA+PROBE: NOT DETECTED
C3 SERPL-MCNC: 105 MG/DL (ref 81–157)
C4 SERPL-MCNC: 18 MG/DL (ref 13–39)
DSDNA AB SER-ACNC: 0.7 IU/ML
E CHAFFEENSIS DNA BLD QL NAA+PROBE: NOT DETECTED
E EWINGII DNA SPEC QL NAA+PROBE: NOT DETECTED
EHRLICHIA DNA SPEC QL NAA+PROBE: NOT DETECTED
RHEUMATOID FACT SER NEPH-ACNC: 15 IU/ML

## 2023-03-07 ENCOUNTER — TELEPHONE (OUTPATIENT)
Dept: FAMILY MEDICINE | Facility: CLINIC | Age: 18
End: 2023-03-07
Payer: COMMERCIAL

## 2023-03-07 NOTE — TELEPHONE ENCOUNTER
General Call      Reason for Call:  Medical question    What are your questions or concerns:  Pt called in requesting to speak to  regarding test results. Pt states she has gotten calls from different clinics to schedule an appt and isn't sure why and would just like more clarification about her results and her health. Please give her a call back at your earliest convenient.      Date of last appointment with provider: 2/27/23    Could we send this information to you in [employer] or would you prefer to receive a phone call?:   Patient would prefer a phone call   Okay to leave a detailed message?: Yes at Home number on file [phone] (home)

## 2023-03-07 NOTE — TELEPHONE ENCOUNTER
Called patient she wants to know the specifics of what tests tested for what and she is wondering if she has rheumatoid arthritis or lupus she wants to know this before she schedules with rheumatology. She only wants to speak with you at this point.

## 2023-03-08 NOTE — TELEPHONE ENCOUNTER
I have sent pt several [employer] messages about her testing, etc.     She should check on [employer] and send me more questions this way as able.

## 2023-03-16 ENCOUNTER — NURSE TRIAGE (OUTPATIENT)
Dept: NURSING | Facility: CLINIC | Age: 18
End: 2023-03-16
Payer: COMMERCIAL

## 2023-03-16 ENCOUNTER — HOSPITAL ENCOUNTER (EMERGENCY)
Facility: HOSPITAL | Age: 18
Discharge: HOME | End: 2023-03-16
Attending: EMERGENCY MEDICINE | Admitting: EMERGENCY MEDICINE
Payer: COMMERCIAL

## 2023-03-16 VITALS
DIASTOLIC BLOOD PRESSURE: 72 MMHG | BODY MASS INDEX: 23.92 KG/M2 | SYSTOLIC BLOOD PRESSURE: 105 MMHG | WEIGHT: 135 LBS | TEMPERATURE: 98.3 F | OXYGEN SATURATION: 100 % | HEART RATE: 90 BPM | RESPIRATION RATE: 30 BRPM | HEIGHT: 63 IN

## 2023-03-16 DIAGNOSIS — R00.2 PALPITATIONS: ICD-10-CM

## 2023-03-16 DIAGNOSIS — F41.1 ANXIETY REACTION: ICD-10-CM

## 2023-03-16 LAB
ANION GAP SERPL CALCULATED.3IONS-SCNC: 10 MMOL/L (ref 7–15)
BASOPHILS # BLD AUTO: 0 10E3/UL (ref 0–0.2)
BASOPHILS NFR BLD AUTO: 0 %
BUN SERPL-MCNC: 11 MG/DL (ref 6–20)
CALCIUM SERPL-MCNC: 9.4 MG/DL (ref 8.6–10)
CHLORIDE SERPL-SCNC: 104 MMOL/L (ref 98–107)
CREAT SERPL-MCNC: 0.53 MG/DL (ref 0.51–0.95)
DEPRECATED HCO3 PLAS-SCNC: 23 MMOL/L (ref 22–29)
EOSINOPHIL # BLD AUTO: 0 10E3/UL (ref 0–0.7)
EOSINOPHIL NFR BLD AUTO: 0 %
ERYTHROCYTE [DISTWIDTH] IN BLOOD BY AUTOMATED COUNT: 13.3 % (ref 10–15)
GFR SERPL CREATININE-BSD FRML MDRD: >90 ML/MIN/1.73M2
GLUCOSE SERPL-MCNC: 86 MG/DL (ref 70–99)
HCG SERPL QL: NEGATIVE
HCT VFR BLD AUTO: 38.2 % (ref 35–47)
HGB BLD-MCNC: 12.8 G/DL (ref 11.7–15.7)
IMM GRANULOCYTES # BLD: 0 10E3/UL
IMM GRANULOCYTES NFR BLD: 0 %
LYMPHOCYTES # BLD AUTO: 1.1 10E3/UL (ref 0.8–5.3)
LYMPHOCYTES NFR BLD AUTO: 22 %
MAGNESIUM SERPL-MCNC: 1.7 MG/DL (ref 1.7–2.3)
MCH RBC QN AUTO: 28.8 PG (ref 26.5–33)
MCHC RBC AUTO-ENTMCNC: 33.5 G/DL (ref 31.5–36.5)
MCV RBC AUTO: 86 FL (ref 78–100)
MONOCYTES # BLD AUTO: 0.4 10E3/UL (ref 0–1.3)
MONOCYTES NFR BLD AUTO: 8 %
NEUTROPHILS # BLD AUTO: 3.6 10E3/UL (ref 1.6–8.3)
NEUTROPHILS NFR BLD AUTO: 70 %
NRBC # BLD AUTO: 0 10E3/UL
NRBC BLD AUTO-RTO: 0 /100
PLATELET # BLD AUTO: 295 10E3/UL (ref 150–450)
POTASSIUM SERPL-SCNC: 4.1 MMOL/L (ref 3.4–5.3)
RBC # BLD AUTO: 4.44 10E6/UL (ref 3.8–5.2)
SODIUM SERPL-SCNC: 137 MMOL/L (ref 136–145)
TROPONIN T SERPL HS-MCNC: 8 NG/L
TSH SERPL DL<=0.005 MIU/L-ACNC: 1.66 UIU/ML (ref 0.3–4.2)
WBC # BLD AUTO: 5.2 10E3/UL (ref 4–11)

## 2023-03-16 PROCEDURE — 84443 ASSAY THYROID STIM HORMONE: CPT | Performed by: EMERGENCY MEDICINE

## 2023-03-16 PROCEDURE — 80048 BASIC METABOLIC PNL TOTAL CA: CPT | Performed by: EMERGENCY MEDICINE

## 2023-03-16 PROCEDURE — 84703 CHORIONIC GONADOTROPIN ASSAY: CPT | Performed by: EMERGENCY MEDICINE

## 2023-03-16 PROCEDURE — 93005 ELECTROCARDIOGRAM TRACING: CPT | Performed by: EMERGENCY MEDICINE

## 2023-03-16 PROCEDURE — 85025 COMPLETE CBC W/AUTO DIFF WBC: CPT | Performed by: EMERGENCY MEDICINE

## 2023-03-16 PROCEDURE — 83735 ASSAY OF MAGNESIUM: CPT | Performed by: EMERGENCY MEDICINE

## 2023-03-16 PROCEDURE — 36415 COLL VENOUS BLD VENIPUNCTURE: CPT | Performed by: EMERGENCY MEDICINE

## 2023-03-16 PROCEDURE — 84484 ASSAY OF TROPONIN QUANT: CPT | Performed by: EMERGENCY MEDICINE

## 2023-03-16 PROCEDURE — 99284 EMERGENCY DEPT VISIT MOD MDM: CPT

## 2023-03-16 RX ORDER — HYDROXYZINE HYDROCHLORIDE 25 MG/1
25 TABLET, FILM COATED ORAL 3 TIMES DAILY PRN
Qty: 20 TABLET | Refills: 0 | Status: SHIPPED | OUTPATIENT
Start: 2023-03-16 | End: 2023-03-27

## 2023-03-16 ASSESSMENT — ENCOUNTER SYMPTOMS
VOMITING: 0
PALPITATIONS: 1
NERVOUS/ANXIOUS: 1
CHEST TIGHTNESS: 1
SHORTNESS OF BREATH: 1
NAUSEA: 0

## 2023-03-16 ASSESSMENT — ACTIVITIES OF DAILY LIVING (ADL): ADLS_ACUITY_SCORE: 35

## 2023-03-16 NOTE — ED PROVIDER NOTES
EMERGENCY DEPARTMENT ENCOUNTER     NAME: Hoa Ames   AGE: 18 year old female   YOB: 2005   MRN: [id number]   EVALUATION DATE & TIME: 3/16/2023  2:32 PM   PCP: Varsha Garcia     Chief Complaint   Patient presents with     Anxiety   :    FINAL IMPRESSION       1. Palpitations    2. Anxiety reaction           ED COURSE & MEDICAL DECISION MAKING      Pertinent Labs & Imaging studies reviewed. (See chart for details)   18 year old female  presents to the Emergency Department for evaluation of episodes of feeling like she cannot breathe with her heart pounding and feeling scared.  She has a history of previous anxiety and has been under significant stress recently. Initial Vitals Reviewed. Initial exam notable for patient who is now calm with normal vitals, clear lungs.  I did consider something like thyroid dysfunction, electrolyte abnormality, pregnancy, arrhythmia but this sounds clinically most like anxiety reaction.  No arrhythmia is noted on cardiac monitoring here in the ED, EKG is unremarkable with no arrhythmia, and lab work is all negative.  I discussed my suspicion with patient and at this time we are going to do a trial of hydroxyzine for symptoms at home.  She also notes that she does not currently have a primary care doctor so I placed a referral so that she has someone to follow-up with as an outpatient.             2:40 PM I met the patient and performed my initial interview and exam.     At the conclusion of the encounter I discussed the results of all of the tests and the disposition. The questions were answered. The patient or family acknowledged understanding and was agreeable with the care plan.             Medical Decision Making    History:    Supplemental history from: N/A    External Record(s) reviewed: Documented in chart, if applicable. and Outpatient Record: history    Work Up:    Chart documentation includes differential considered and any EKGs or imaging independently  interpreted by provider, where specified.    In additional to work up documented, I considered the following work up: Documented in chart, if applicable.    External consultation:    Discussion of management with another provider: Documented in chart, if applicable    Complicating factors:    Care impacted by chronic illness: Mental Health    Care affected by social determinants of health: Access to Medical Care    Disposition considerations: Discharge. I prescribed additional prescription strength medication(s) as charted. I considered admission, but ultimately discharged patient with reassuring workup.      MEDICATIONS GIVEN IN THE EMERGENCY:   Medications - No data to display   NEW PRESCRIPTIONS STARTED AT TODAY'S ER VISIT   New Prescriptions    No medications on file     ================================================================   HISTORY OF PRESENT ILLNESS       Patient information was obtained from: patient    Use of Intrepreter: N/A  Hoa Ames is a 18 year old female with history of PTSD, depression who presents to the ED for evaluation of anxiety.    The patient reports some heart racing, shortness of breath, chest tightness, and anxiety since last night. She states she was able to sleep for 4 hours, but woke up with her symptoms still present. States they have waxed and waned in severity and are relatively mild at time of evaluation. The patient endorses a history of anxiety and panic attacks, but states she does not get them frequently. She notes she has had increased stress lately. Otherwise, she denies any changes in her sleep or caffeine intake.     Of note, patient does not have a PCP.    ================================================================    REVIEW OF SYSTEMS       Review of Systems   Respiratory: Positive for chest tightness and shortness of breath.    Cardiovascular: Positive for palpitations (heart racing). Negative for chest pain.   Gastrointestinal: Negative for nausea  "and vomiting.   Psychiatric/Behavioral: The patient is nervous/anxious.    All other systems reviewed and are negative.        PAST HISTORY     PAST MEDICAL HISTORY:   History reviewed. No pertinent past medical history.   PAST SURGICAL HISTORY:   History reviewed. No pertinent surgical history.   CURRENT MEDICATIONS:   No current outpatient medications on file.    ALLERGIES:   Allergies   Allergen Reactions     No Known Allergies       FAMILY HISTORY:   Family History   Problem Relation Age of Onset     Suicide Maternal Grandmother      Cancer Other       SOCIAL HISTORY:   Social History     Socioeconomic History     Marital status: Single   Occupational History     Occupation: [employer]   Tobacco Use     Smoking status: Former     Types: Cigarettes     Quit date: 2021     Years since quittin.2     Smokeless tobacco: Former   Substance and Sexual Activity     Alcohol use: Not Currently     Comment: last drink years ago     Drug use: Not Currently     Comment: once tried LSD     Sexual activity: Yes     Partners: Male     Birth control/protection: Condom   Social History Narrative    Working at [email or web address]    Lives with friend and her family        VITALS  Patient Vitals for the past 24 hrs:   BP Temp Pulse Resp SpO2 Height Weight   23 1431 -- -- 99 20 100 % -- --   23 1427 124/62 98.3  F (36.8  C) 108 20 -- 1.6 m (5' 3\") 61.2 kg (135 lb)        ================================================================    PHYSICAL EXAM     VITAL SIGNS: /62   Pulse 99   Temp 98.3  F (36.8  C)   Resp 20   Ht 1.6 m (5' 3\")   Wt 61.2 kg (135 lb)   LMP 2023   SpO2 100%   BMI 23.91 kg/m     Constitutional:  Awake, no acute distress   HENT:  Atraumatic, oropharynx without exudate or erythema, membranes moist  Lymph:  No adenopathy  Eyes: EOM intact, PERRL, no injection  Neck: Supple  Respiratory:  Clear to auscultation bilaterally, no wheezes or crackles   Cardiovascular:  Regular rate and rhythm, " single S1 and S2   GI:  Soft, nontender, nondistended, no rebound or guarding   Musculoskeletal:  Moves all extremities, no lower extremity edema, no deformities    Skin:  Warm, dry  Neurologic:  Alert and oriented x3, no focal deficits noted       ================================================================  LAB       All pertinent labs reviewed and interpreted.   Labs Ordered and Resulted from Time of ED Arrival to Time of ED Departure   BASIC METABOLIC PANEL - Normal       Result Value    Sodium 137      Potassium 4.1      Chloride 104      Carbon Dioxide (CO2) 23      Anion Gap 10      Urea Nitrogen 11.0      Creatinine 0.53      Calcium 9.4      Glucose 86      GFR Estimate >90     TROPONIN T, HIGH SENSITIVITY - Normal    Troponin T, High Sensitivity 8     MAGNESIUM - Normal    Magnesium 1.7     TSH WITH FREE T4 REFLEX - Normal    TSH 1.66     HCG QUALITATIVE PREGNANCY - Normal    hCG Serum Qualitative Negative     CBC WITH PLATELETS AND DIFFERENTIAL    WBC Count 5.2      RBC Count 4.44      Hemoglobin 12.8      Hematocrit 38.2      MCV 86      MCH 28.8      MCHC 33.5      RDW 13.3      Platelet Count 295      % Neutrophils 70      % Lymphocytes 22      % Monocytes 8      % Eosinophils 0      % Basophils 0      % Immature Granulocytes 0      NRBCs per 100 WBC 0      Absolute Neutrophils 3.6      Absolute Lymphocytes 1.1      Absolute Monocytes 0.4      Absolute Eosinophils 0.0      Absolute Basophils 0.0      Absolute Immature Granulocytes 0.0      Absolute NRBCs 0.0          ===============================================================  RADIOLOGY       Reviewed all pertinent imaging. Please see official radiology report.   No orders to display         ================================================================  EKG     EKG reviewed interpreted by me shows sinus rhythm with rate of 83, left axis, QTc 408 with no acute ST or T wave changes no previous to compare    I have independently reviewed and  interpreted the EKG(s) documented above.     ================================================================  PROCEDURES         I, Belen MontenegroKarin, am serving as a scribe to document services personally performed by Dr. Villa based on my observation and the provider's statements to me. I, Nusrat Villa MD attest that Belenny MontenegroKarin is acting in a scribe capacity, has observed my performance of the services and has documented them in accordance with my direction.   Nusrat Villa M.D.   Emergency Medicine   [facility] EMERGENCY DEPARTMENT  [address] [place] [id number]  [phone]  Dept: [phone]      Nusrat Villa MD  03/16/23 0978

## 2023-03-16 NOTE — TELEPHONE ENCOUNTER
Pt calling, having symptoms of SOB, heart palpitations. Comes and goes, last up to hours or a whole day, feels light headed, like passing out.     Triage to call 911/EMS, care advice given. Pt states she will drive self to ED, Encouraged to call for 911/EMS, pt does not have anyone with her. Pt verbalized understanding and will decide. Call back if have more questions/concerns.     Paola Dietz RN, BSN  3/16/2023 at 2:02 PM  [facility]      Reason for Disposition    Difficult to awaken or acting confused (e.g., disoriented, slurred speech)    Additional Information    Negative: Passed out (i.e., lost consciousness, collapsed and was not responding)    Negative: Shock suspected (e.g., cold/pale/clammy skin, too weak to stand, low BP, rapid pulse)    Protocols used: HEART RATE AND HEARTBEAT SILUSSPEK-Y-VR

## 2023-03-16 NOTE — ED TRIAGE NOTES
Patient with history of panic attacks, states last night she felt SOB, anxious, was able to go to sleep for 4 hours, again this am feeling SOB,heart racing

## 2023-03-18 LAB
ATRIAL RATE - MUSE: 83 BPM
DIASTOLIC BLOOD PRESSURE - MUSE: NORMAL MMHG
INTERPRETATION ECG - MUSE: NORMAL
P AXIS - MUSE: 66 DEGREES
PR INTERVAL - MUSE: 118 MS
QRS DURATION - MUSE: 74 MS
QT - MUSE: 348 MS
QTC - MUSE: 408 MS
R AXIS - MUSE: 80 DEGREES
SYSTOLIC BLOOD PRESSURE - MUSE: NORMAL MMHG
T AXIS - MUSE: 66 DEGREES
VENTRICULAR RATE- MUSE: 83 BPM

## 2023-03-19 ENCOUNTER — OFFICE VISIT (OUTPATIENT)
Dept: FAMILY MEDICINE | Facility: CLINIC | Age: 18
End: 2023-03-19
Payer: COMMERCIAL

## 2023-03-19 VITALS
HEART RATE: 92 BPM | BODY MASS INDEX: 23.91 KG/M2 | DIASTOLIC BLOOD PRESSURE: 75 MMHG | WEIGHT: 135 LBS | SYSTOLIC BLOOD PRESSURE: 110 MMHG | RESPIRATION RATE: 14 BRPM | OXYGEN SATURATION: 99 % | TEMPERATURE: 98.7 F

## 2023-03-19 DIAGNOSIS — J36 PERITONSILLAR CELLULITIS: Primary | ICD-10-CM

## 2023-03-19 DIAGNOSIS — R59.0 CERVICAL LYMPHADENOPATHY: ICD-10-CM

## 2023-03-19 LAB
ALBUMIN SERPL-MCNC: 3.9 G/DL (ref 3.5–5)
ALP SERPL-CCNC: 61 U/L (ref 45–120)
ALT SERPL W P-5'-P-CCNC: 16 U/L (ref 0–45)
ANION GAP SERPL CALCULATED.3IONS-SCNC: 8 MMOL/L (ref 5–18)
AST SERPL W P-5'-P-CCNC: 23 U/L (ref 0–40)
BASOPHILS # BLD AUTO: 0 10E3/UL (ref 0–0.2)
BASOPHILS NFR BLD AUTO: 0 %
BILIRUB SERPL-MCNC: 0.5 MG/DL (ref 0–1)
BUN SERPL-MCNC: 9 MG/DL (ref 8–22)
CALCIUM SERPL-MCNC: 9.3 MG/DL (ref 8.5–10.5)
CHLORIDE BLD-SCNC: 107 MMOL/L (ref 98–107)
CO2 SERPL-SCNC: 22 MMOL/L (ref 22–31)
CREAT SERPL-MCNC: 0.62 MG/DL (ref 0.6–1.1)
DEPRECATED S PYO AG THROAT QL EIA: NEGATIVE
EOSINOPHIL # BLD AUTO: 0 10E3/UL (ref 0–0.7)
EOSINOPHIL NFR BLD AUTO: 0 %
ERYTHROCYTE [DISTWIDTH] IN BLOOD BY AUTOMATED COUNT: 13.7 % (ref 10–15)
GFR SERPL CREATININE-BSD FRML MDRD: >90 ML/MIN/1.73M2
GLUCOSE BLD-MCNC: 82 MG/DL (ref 70–125)
GROUP A STREP BY PCR: NOT DETECTED
HCT VFR BLD AUTO: 35.2 % (ref 35–47)
HGB BLD-MCNC: 11.6 G/DL (ref 11.7–15.7)
IMM GRANULOCYTES # BLD: 0 10E3/UL
IMM GRANULOCYTES NFR BLD: 0 %
LYMPHOCYTES # BLD AUTO: 1.2 10E3/UL (ref 0.8–5.3)
LYMPHOCYTES NFR BLD AUTO: 27 %
MCH RBC QN AUTO: 28.6 PG (ref 26.5–33)
MCHC RBC AUTO-ENTMCNC: 33 G/DL (ref 31.5–36.5)
MCV RBC AUTO: 87 FL (ref 78–100)
MONOCYTES # BLD AUTO: 0.5 10E3/UL (ref 0–1.3)
MONOCYTES NFR BLD AUTO: 11 %
MONOCYTES NFR BLD AUTO: NEGATIVE %
NEUTROPHILS # BLD AUTO: 2.7 10E3/UL (ref 1.6–8.3)
NEUTROPHILS NFR BLD AUTO: 62 %
NRBC # BLD AUTO: 0 10E3/UL
NRBC BLD AUTO-RTO: 0 /100
PLATELET # BLD AUTO: 239 10E3/UL (ref 150–450)
POTASSIUM BLD-SCNC: 3.9 MMOL/L (ref 3.5–5)
PROT SERPL-MCNC: 7.7 G/DL (ref 6–8)
RBC # BLD AUTO: 4.06 10E6/UL (ref 3.8–5.2)
RETICS # AUTO: 0.03 10E6/UL (ref 0.01–0.11)
RETICS/RBC NFR AUTO: 0.8 % (ref 0.8–2.7)
SODIUM SERPL-SCNC: 137 MMOL/L (ref 136–145)
WBC # BLD AUTO: 4.5 10E3/UL (ref 4–11)

## 2023-03-19 PROCEDURE — 80053 COMPREHEN METABOLIC PANEL: CPT | Performed by: FAMILY MEDICINE

## 2023-03-19 PROCEDURE — 85730 THROMBOPLASTIN TIME PARTIAL: CPT | Performed by: FAMILY MEDICINE

## 2023-03-19 PROCEDURE — 86308 HETEROPHILE ANTIBODY SCREEN: CPT | Performed by: FAMILY MEDICINE

## 2023-03-19 PROCEDURE — 99215 OFFICE O/P EST HI 40 MIN: CPT | Performed by: FAMILY MEDICINE

## 2023-03-19 PROCEDURE — 36415 COLL VENOUS BLD VENIPUNCTURE: CPT | Performed by: FAMILY MEDICINE

## 2023-03-19 PROCEDURE — 85613 RUSSELL VIPER VENOM DILUTED: CPT | Performed by: FAMILY MEDICINE

## 2023-03-19 PROCEDURE — 87651 STREP A DNA AMP PROBE: CPT | Performed by: FAMILY MEDICINE

## 2023-03-19 PROCEDURE — 85025 COMPLETE CBC W/AUTO DIFF WBC: CPT | Performed by: FAMILY MEDICINE

## 2023-03-19 PROCEDURE — 85045 AUTOMATED RETICULOCYTE COUNT: CPT | Performed by: FAMILY MEDICINE

## 2023-03-19 PROCEDURE — 85390 FIBRINOLYSINS SCREEN I&R: CPT | Performed by: PATHOLOGY

## 2023-03-19 RX ORDER — AMOXICILLIN AND CLAVULANATE POTASSIUM 600; 42.9 MG/5ML; MG/5ML
1000 POWDER, FOR SUSPENSION ORAL 2 TIMES DAILY
Qty: 166 ML | Refills: 0 | Status: SHIPPED | OUTPATIENT
Start: 2023-03-19 | End: 2023-03-19

## 2023-03-19 RX ORDER — AMOXICILLIN AND CLAVULANATE POTASSIUM 600; 42.9 MG/5ML; MG/5ML
1000 POWDER, FOR SUSPENSION ORAL 2 TIMES DAILY
Qty: 166 ML | Refills: 0 | Status: SHIPPED | OUTPATIENT
Start: 2023-03-19

## 2023-03-19 NOTE — PROGRESS NOTES
ASSESSMENT/PLAN:      ICD-10-CM    1. Peritonsillar cellulitis  J36 Streptococcus A Rapid Screen w/Reflex to PCR - Clinic Collect     Group A Streptococcus PCR Throat Swab     amoxicillin-clavulanate (AUGMENTIN-ES) 600-42.9 MG/5ML suspension     DISCONTINUED: amoxicillin-clavulanate (AUGMENTIN-ES) 600-42.9 MG/5ML suspension      2. Cervical lymphadenopathy  R59.0 Mononucleosis screen     CBC with platelets and differential     Comprehensive metabolic panel     Mononucleosis screen     Comprehensive metabolic panel     CANCELED: CBC with platelets and differential    bilateral anterior and posterior chains, tender node on mid right posterior chain which is decreased in size since onset 6 days ago          Reviewed medication instructions and side effects. Follow up if experiences side effects.     I reviewed supportive care, otc meds to use if needed, expected course, and signs of concern.  Follow up as needed or if she does not improve within  1-2 days or if worsens in any way.  Reviewed red flag symptoms and is to go to the ER if experiences any of these.     The use of Dragon/[employer] dictation services may have been used to construct the content in this note; any grammatical or spelling errors are non-intentional. Please contact the author of this note directly if you are in need of any clarification.        Strep results reviewed with patient at time of appointment, she was notified via phone of her negative mono and normal  CMP and CBC.  With negative mono, significant findings of enlarged, erythematous exudative tonsils  opted to treat her peritonsillar cellulitis with Augmentin.          On the day of the encounter, time spend on chart review, patient visit, review of testing, documentation was 40 minutes          Patient Instructions     If unable to swallow your saliva, 1 tonsil is larger than the other, to ER    Follow up as needed or if your symptoms worsen in any way.     Follow up with your  primary care provider or clinic in about 2-3 days if your symptoms do not improve                     Patient presents with:  Lesion: Has bump rt side of neck can feel it when she turns her head had red spot there earlier this week thought bug bite       Subjective     Hoa Ames is a 18 year old female who presents to clinic today for the following health issues:    HPI   Patient with onset of sore throat 7 days ago, able to swallow, able to swallow her saliva  Patient noticed a small raised lump on the right side of her neck just below the ear slightly red in color since that time the area has decreased in size and is no longer red, patient initially thought it was a small pimple and picked at the area which caused the initial onset of redness  No fever, no cough, no wheezing  positive for myalgias, fatigue, no nausea vomiting or diarrhea  No tylenol or ibuprofen for myalgias or other OTC meds  Patient seen in the ER on 3/16/2023 for palpitations with a final diagnosis of anxiety, patient denies any palpitations since that time, heart rate today is 92    No past medical history on file.  Social History     Tobacco Use     Smoking status: Former     Types: Cigarettes     Quit date: 2021     Years since quittin.2     Smokeless tobacco: Former   Substance Use Topics     Alcohol use: Not Currently     Comment: last drink years ago       Current Outpatient Medications   Medication Sig Dispense Refill     amoxicillin-clavulanate (AUGMENTIN-ES) 600-42.9 MG/5ML suspension Take 8.3 mLs (1,000 mg) by mouth 2 times daily 166 mL 0     hydrOXYzine (ATARAX) 25 MG tablet Take 1 tablet (25 mg) by mouth 3 times daily as needed for anxiety (Patient not taking: Reported on 3/19/2023) 20 tablet 0     Allergies   Allergen Reactions     No Known Allergies              ROS are negative, except as otherwise noted HPI      Objective    /75   Pulse 92   Temp 98.7  F (37.1  C) (Oral)   Resp 14   Wt 61.2 kg (135 lb)    LMP 02/26/2023   SpO2 99%   BMI 23.91 kg/m    Body mass index is 23.91 kg/m .  Physical Exam     GENERAL: Fatigued, no acute distress.   HEENT: Diffuse pharyngeal erythema. Tonsils enlarged, erythematous, mild exudate bilateral uvula is midline.  Sclera, lids and conjunctiva are normal.  Nose congestion and ears clear.  NECK: Shotty anterior and posterior chain bilateral adenopathy, BB size or prominent lymph node mid posterior chain on the right  CHEST:  clear, no wheezing or rales. Normal symmetric air entry throughout both lung fields.   HEART:  S1 and S2 normal, no murmurs, clicks, gallops or rubs. Regular rate and rhythm.  NEURO: Alert and oriented x3, normal strength and tone, normal gait  SKIN: No rashes       Diagnostic Test Results:  Labs reviewed in Epic  Results for orders placed or performed in visit on 03/19/23   Mononucleosis screen     Status: Normal   Result Value Ref Range    Mononucleosis Screen Negative Negative   Comprehensive metabolic panel     Status: Normal   Result Value Ref Range    Sodium 137 136 - 145 mmol/L    Potassium 3.9 3.5 - 5.0 mmol/L    Chloride 107 98 - 107 mmol/L    Carbon Dioxide (CO2) 22 22 - 31 mmol/L    Anion Gap 8 5 - 18 mmol/L    Urea Nitrogen 9 8 - 22 mg/dL    Creatinine 0.62 0.60 - 1.10 mg/dL    Calcium 9.3 8.5 - 10.5 mg/dL    Glucose 82 70 - 125 mg/dL    Alkaline Phosphatase 61 45 - 120 U/L    AST 23 0 - 40 U/L    ALT 16 0 - 45 U/L    Protein Total 7.7 6.0 - 8.0 g/dL    Albumin 3.9 3.5 - 5.0 g/dL    Bilirubin Total 0.5 0.0 - 1.0 mg/dL    GFR Estimate >90 >60 mL/min/1.73m2   CBC with platelets and differential     Status: Abnormal   Result Value Ref Range    WBC Count 4.5 4.0 - 11.0 10e3/uL    RBC Count 4.06 3.80 - 5.20 10e6/uL    Hemoglobin 11.6 (L) 11.7 - 15.7 g/dL    Hematocrit 35.2 35.0 - 47.0 %    MCV 87 78 - 100 fL    MCH 28.6 26.5 - 33.0 pg    MCHC 33.0 31.5 - 36.5 g/dL    RDW 13.7 10.0 - 15.0 %    Platelet Count 239 150 - 450 10e3/uL    % Neutrophils 62 %     % Lymphocytes 27 %    % Monocytes 11 %    % Eosinophils 0 %    % Basophils 0 %    % Immature Granulocytes 0 %    NRBCs per 100 WBC 0 <1 /100    Absolute Neutrophils 2.7 1.6 - 8.3 10e3/uL    Absolute Lymphocytes 1.2 0.8 - 5.3 10e3/uL    Absolute Monocytes 0.5 0.0 - 1.3 10e3/uL    Absolute Eosinophils 0.0 0.0 - 0.7 10e3/uL    Absolute Basophils 0.0 0.0 - 0.2 10e3/uL    Absolute Immature Granulocytes 0.0 <=0.4 10e3/uL    Absolute NRBCs 0.0 10e3/uL   Streptococcus A Rapid Screen w/Reflex to PCR - Clinic Collect     Status: Normal    Specimen: Throat; Swab   Result Value Ref Range    Group A Strep antigen Negative Negative   Group A Streptococcus PCR Throat Swab     Status: Normal    Specimen: Throat; Swab   Result Value Ref Range    Group A strep by PCR Not Detected Not Detected    Narrative    The Xpert Xpress Strep A test, performed on the [employer]  Instrument Systems, is a rapid, qualitative in vitro diagnostic test for the detection of Streptococcus pyogenes (Group A ß-hemolytic Streptococcus, Strep A) in throat swab specimens from patients with signs and symptoms of pharyngitis. The Xpert Xpress Strep A test can be used as an aid in the diagnosis of Group A Streptococcal pharyngitis. The assay is not intended to monitor treatment for Group A Streptococcus infections. The Xpert Xpress Strep A test utilizes an automated real-time polymerase chain reaction (PCR) to detect Streptococcus pyogenes DNA.

## 2023-03-19 NOTE — PATIENT INSTRUCTIONS
If unable to swallow your saliva, 1 tonsil is larger than the other, to ER    Follow up as needed or if your symptoms worsen in any way.     Follow up with your primary care provider or clinic in about 2-3 days if your symptoms do not improve

## 2023-03-20 DIAGNOSIS — F43.10 PTSD (POST-TRAUMATIC STRESS DISORDER): Primary | ICD-10-CM

## 2023-03-20 LAB
DRVVT SCREEN RATIO: 0.88
INR PPP: 1.14 (ref 0.85–1.15)
LA PPP-IMP: NEGATIVE
LUPUS INTERPRETATION: ABNORMAL
PATH REPORT.COMMENTS IMP SPEC: NORMAL
PATH REPORT.COMMENTS IMP SPEC: NORMAL
PATH REPORT.FINAL DX SPEC: NORMAL
PATH REPORT.RELEVANT HX SPEC: NORMAL
PLATELET NEUTRALIZATION: -4 SECONDS
PTT 1:2 MIX: 38 SECONDS (ref 31–45)
PTT RATIO: 1.32
THROMBIN TIME: 16.2 SECONDS (ref 13–19)

## 2023-03-20 NOTE — TELEPHONE ENCOUNTER
Medication Question or Refill    Contacts       Type Contact Phone/Fax    03/20/2023 01:17 PM CDT Phone (Incoming) Hoa Ames (Self) [phone] (M)          What medication are you calling about (include dose and sig)?: hydrOXYzine (ATARAX) 25 MG tablet    Preferred Pharmacy:   [facility] [address] [place] [address] AT [address] [address]  Phone: [phone] Fax: [phone]    Controlled Substance Agreement on file:   CSA -- Patient Level:    CSA: None found at the patient level.       Who prescribed the medication?: Dr. Nusrat Villa    Do you need a refill? Yes, patient was prescribed this during an emergency room visit and went to the pharmacy and it was not there    When did you use the medication last? n/a    Patient offered an appointment? No    Do you have any questions or concerns?  No      Could we send this information to you in [facility] or would you prefer to receive a phone call?:   Patient would prefer a phone call   Okay to leave a detailed message?: Yes at Cell number on file:    Telephone Information:   Mobile [phone]

## 2023-03-27 RX ORDER — HYDROXYZINE HYDROCHLORIDE 25 MG/1
25 TABLET, FILM COATED ORAL 3 TIMES DAILY PRN
Qty: 20 TABLET | Refills: 0 | Status: SHIPPED | OUTPATIENT
Start: 2023-03-27 | End: 2023-04-07

## 2023-03-27 NOTE — TELEPHONE ENCOUNTER
Message routed to Dr Radha solis review / plan    Sharonda Moreno RN  [facility]      Rebeca Zaragoza RN  [facility] [place]   Please connect with patient as script from ED was sent home as a local print, so is wihtin the paperwork she took home and she didn't take it with to the hospital.  Unable to process refill for ED medicatoin

## 2023-03-30 ENCOUNTER — TELEPHONE (OUTPATIENT)
Dept: FAMILY MEDICINE | Facility: CLINIC | Age: 18
End: 2023-03-30

## 2023-03-30 DIAGNOSIS — K11.1 PAROTID GLAND ENLARGEMENT: ICD-10-CM

## 2023-03-30 NOTE — TELEPHONE ENCOUNTER
General Call    Contacts       Type Contact Phone/Fax    03/30/2023 02:17 PM CDT Phone (Incoming) Hoa Ames (Self) [phone] (M)        Reason for Call:  Rheumatology Referral    What are your questions or concerns:  Patient called because she went to make an appointment to see a rheumatologist per her referral, and the soonest available appointment is in June. Patient is concerned about waiting that long because she is sure that she will get sick again. Wondering if the provider could assist in any way to get her an earlier appointment. Please advise patient.    Date of last appointment with provider: 2/27/2023    Could we send this information to you in [email or web address] or would you prefer to receive a phone call?:   Patient would prefer a phone call   Okay to leave a detailed message?: Yes at Cell number on file:    Telephone Information:   Mobile [phone]

## 2023-03-31 RX ORDER — NAPROXEN 500 MG/1
500 TABLET ORAL 2 TIMES DAILY PRN
Qty: 60 TABLET | Refills: 0 | Status: SHIPPED | OUTPATIENT
Start: 2023-03-31 | End: 2023-05-04

## 2023-03-31 NOTE — TELEPHONE ENCOUNTER
"Routing refill request to provider for review/approval because:  Drug not active on patient's medication list  Labs out of range.    Last Written Prescription Date:  2/14/23  Last Fill Quantity: 21,  # refills: 0   Last office visit provider:  3/19/23     Requested Prescriptions   Pending Prescriptions Disp Refills     naproxen (NAPROSYN) 500 MG tablet 21 tablet 0     Sig: Take 1 tablet (500 mg) by mouth 3 times daily (with meals)       NSAID Medications Failed - 3/30/2023  4:49 PM        Failed - Normal CBC on file in past 12 months     Recent Labs   Lab Test 03/19/23  1446   WBC 4.5   RBC 4.06   HGB 11.6*   HCT 35.2                    Failed - Medication is active on med list        Passed - Blood pressure under 140/90 in past 12 months     BP Readings from Last 3 Encounters:   03/19/23 110/75   03/16/23 105/72   02/27/23 106/69                 Passed - Normal ALT on file in past 12 months     Recent Labs   Lab Test 03/19/23  1446   ALT 16             Passed - Normal AST on file in past 12 months     Recent Labs   Lab Test 03/19/23  1446   AST 23             Passed - Recent (12 mo) or future (30 days) visit within the authorizing provider's specialty     Patient has had an office visit with the authorizing provider or a provider within the authorizing providers department within the previous 12 mos or has a future within next 30 days. See \"Patient Info\" tab in inbasket, or \"Choose Columns\" in Meds & Orders section of the refill encounter.              Passed - Patient is age 6-64 years        Passed - No active pregnancy on record        Passed - Normal serum creatinine on file in past 12 months     Recent Labs   Lab Test 03/19/23  1446   CR 0.62       Ok to refill medication if creatinine is low          Passed - No positive pregnancy test in past 12 months             BRIAN GODINEZ, RN 03/31/23 12:52 PM  "

## 2023-04-07 ENCOUNTER — TELEPHONE (OUTPATIENT)
Dept: FAMILY MEDICINE | Facility: CLINIC | Age: 18
End: 2023-04-07
Payer: COMMERCIAL

## 2023-04-07 NOTE — TELEPHONE ENCOUNTER
General Call      Reason for Call:  Medical question    What are your questions or concerns:  Pt called in to request to speak with  directly as she is concerned about her health. Please give her a call back at your earliest convenient.  Thanks!      Date of last appointment with provider: 2/27/23    Could we send this information to you in [employer] or would you prefer to receive a phone call?:   Patient would prefer a phone call   Okay to leave a detailed message?: Yes at Home number on file [phone] (home)

## 2023-04-07 NOTE — TELEPHONE ENCOUNTER
Called and spoke to  with [facility]. Per , rheumatology have review referral and notes and determine that pt did not need to be seen as soon as possible and that is why they have her schedule for next available. Per , if provider need  pt to be seen sooner, provider will need to discuss reason with rheumatology provider.

## 2023-05-03 DIAGNOSIS — K11.1 PAROTID GLAND ENLARGEMENT: ICD-10-CM

## 2023-05-04 RX ORDER — NAPROXEN 500 MG/1
TABLET ORAL
Qty: 60 TABLET | Refills: 1 | Status: SHIPPED | OUTPATIENT
Start: 2023-05-04

## 2023-05-04 NOTE — TELEPHONE ENCOUNTER
"Routing refill request to provider for review/approval because:  Labs not current:  CBC     Last Written Prescription Date: 3/31/2023  Last Fill Quantity: 60,  # refills: 0  Last office visit provider: 3/19/2023 with Dr TRE Garcia @ [facility]    Requested Prescriptions   Pending Prescriptions Disp Refills     naproxen (NAPROSYN) 500 MG tablet [Pharmacy Med Name: NAPROXEN 500MG TABLETS] 60 tablet 0     Sig: TAKE 1 TABLET(500 MG) BY MOUTH TWICE DAILY AS NEEDED FOR MODERATE PAIN       NSAID Medications Failed - 5/3/2023  7:04 AM        Failed - Normal CBC on file in past 12 months     Recent Labs   Lab Test 03/19/23  1446   WBC 4.5   RBC 4.06   HGB 11.6*   HCT 35.2                    Passed - Blood pressure under 140/90 in past 12 months     BP Readings from Last 3 Encounters:   03/19/23 110/75   03/16/23 105/72   02/27/23 106/69                 Passed - Normal ALT on file in past 12 months     Recent Labs   Lab Test 03/19/23  1446   ALT 16             Passed - Normal AST on file in past 12 months     Recent Labs   Lab Test 03/19/23  1446   AST 23             Passed - Recent (12 mo) or future (30 days) visit within the authorizing provider's specialty     Patient has had an office visit with the authorizing provider or a provider within the authorizing providers department within the previous 12 mos or has a future within next 30 days. See \"Patient Info\" tab in inbasket, or \"Choose Columns\" in Meds & Orders section of the refill encounter.              Passed - Patient is age 6-64 years        Passed - Medication is active on med list        Passed - No active pregnancy on record        Passed - Normal serum creatinine on file in past 12 months     Recent Labs   Lab Test 03/19/23  1446   CR 0.62       Ok to refill medication if creatinine is low          Passed - No positive pregnancy test in past 12 months             Pia Mullen RN 05/03/23 9:04 PM  "
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
## 2023-06-05 ENCOUNTER — OFFICE VISIT (OUTPATIENT)
Dept: RHEUMATOLOGY | Facility: CLINIC | Age: 18
End: 2023-06-05
Attending: STUDENT IN AN ORGANIZED HEALTH CARE EDUCATION/TRAINING PROGRAM
Payer: COMMERCIAL

## 2023-06-05 VITALS
SYSTOLIC BLOOD PRESSURE: 97 MMHG | HEIGHT: 63 IN | BODY MASS INDEX: 23.28 KG/M2 | WEIGHT: 131.4 LBS | OXYGEN SATURATION: 99 % | DIASTOLIC BLOOD PRESSURE: 63 MMHG | HEART RATE: 72 BPM

## 2023-06-05 DIAGNOSIS — A68.9 RECURRENT FEVER: ICD-10-CM

## 2023-06-05 DIAGNOSIS — D70.9 NEUTROPENIA, UNSPECIFIED TYPE (H): ICD-10-CM

## 2023-06-05 DIAGNOSIS — R76.8 ELEVATED RHEUMATOID FACTOR: ICD-10-CM

## 2023-06-05 DIAGNOSIS — R76.8 ELEVATED ANTINUCLEAR ANTIBODY (ANA) LEVEL: ICD-10-CM

## 2023-06-05 DIAGNOSIS — R63.4 WEIGHT LOSS: Primary | ICD-10-CM

## 2023-06-05 PROCEDURE — 99203 OFFICE O/P NEW LOW 30 MIN: CPT | Mod: GC | Performed by: STUDENT IN AN ORGANIZED HEALTH CARE EDUCATION/TRAINING PROGRAM

## 2023-06-05 PROCEDURE — G0463 HOSPITAL OUTPT CLINIC VISIT: HCPCS | Performed by: STUDENT IN AN ORGANIZED HEALTH CARE EDUCATION/TRAINING PROGRAM

## 2023-06-05 ASSESSMENT — PAIN SCALES - GENERAL: PAINLEVEL: NO PAIN (0)

## 2023-06-05 NOTE — NURSING NOTE
"Chief Complaint   Patient presents with     Consult     PEDRO level Elevated     BP 97/63   Pulse 72   Ht 1.6 m (5' 3\")   Wt 59.6 kg (131 lb 6.4 oz)   SpO2 99%   BMI 23.28 kg/m        Kedar Vargas MA  "
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
## 2023-06-05 NOTE — PATIENT INSTRUCTIONS
- You do have a positive PEDRO (anti-nuclear antibody) test. This means you make antibodies against your own cells. Sometimes this does not mean there are any problems, but we need to do some more lab testing to get some more clarity.     - Schedule a lab appointment at any [facility] lab. If the one in [place] works for you.     - I will call you with the results when they are available, it might be 1-2 weeks after the labs are drawn.

## 2023-06-12 ENCOUNTER — LAB (OUTPATIENT)
Dept: LAB | Facility: CLINIC | Age: 18
End: 2023-06-12
Payer: COMMERCIAL

## 2023-06-12 DIAGNOSIS — R76.8 ELEVATED ANTINUCLEAR ANTIBODY (ANA) LEVEL: ICD-10-CM

## 2023-06-12 DIAGNOSIS — A68.9 RECURRENT FEVER: ICD-10-CM

## 2023-06-12 DIAGNOSIS — D70.9 NEUTROPENIA, UNSPECIFIED TYPE (H): ICD-10-CM

## 2023-06-12 DIAGNOSIS — R63.4 WEIGHT LOSS: ICD-10-CM

## 2023-06-12 LAB
ALBUMIN SERPL BCG-MCNC: 4.1 G/DL (ref 3.5–5.2)
ALBUMIN UR-MCNC: NEGATIVE MG/DL
ALP SERPL-CCNC: 55 U/L (ref 35–104)
ALT SERPL W P-5'-P-CCNC: 14 U/L (ref 10–35)
ANION GAP SERPL CALCULATED.3IONS-SCNC: 10 MMOL/L (ref 7–15)
APPEARANCE UR: ABNORMAL
AST SERPL W P-5'-P-CCNC: 28 U/L (ref 10–35)
BACTERIA #/AREA URNS HPF: ABNORMAL /HPF
BASOPHILS # BLD AUTO: 0 10E3/UL (ref 0–0.2)
BASOPHILS NFR BLD AUTO: 0 %
BILIRUB SERPL-MCNC: 0.3 MG/DL
BILIRUB UR QL STRIP: NEGATIVE
BUN SERPL-MCNC: 14.2 MG/DL (ref 6–20)
CALCIUM SERPL-MCNC: 9.4 MG/DL (ref 8.6–10)
CHLORIDE SERPL-SCNC: 109 MMOL/L (ref 98–107)
COLOR UR AUTO: YELLOW
CREAT SERPL-MCNC: 0.55 MG/DL (ref 0.51–0.95)
CRP SERPL-MCNC: <3 MG/L
DEPRECATED HCO3 PLAS-SCNC: 22 MMOL/L (ref 22–29)
EOSINOPHIL # BLD AUTO: 0 10E3/UL (ref 0–0.7)
EOSINOPHIL NFR BLD AUTO: 1 %
ERYTHROCYTE [DISTWIDTH] IN BLOOD BY AUTOMATED COUNT: 12.7 % (ref 10–15)
ERYTHROCYTE [SEDIMENTATION RATE] IN BLOOD BY WESTERGREN METHOD: 20 MM/HR (ref 0–20)
FERRITIN SERPL-MCNC: 45 NG/ML (ref 6–175)
GFR SERPL CREATININE-BSD FRML MDRD: >90 ML/MIN/1.73M2
GLUCOSE SERPL-MCNC: 83 MG/DL (ref 70–99)
GLUCOSE UR STRIP-MCNC: NEGATIVE MG/DL
HCT VFR BLD AUTO: 35.6 % (ref 35–47)
HGB BLD-MCNC: 12.1 G/DL (ref 11.7–15.7)
HGB UR QL STRIP: NEGATIVE
IMM GRANULOCYTES # BLD: 0 10E3/UL
IMM GRANULOCYTES NFR BLD: 0 %
KETONES UR STRIP-MCNC: NEGATIVE MG/DL
LDH SERPL L TO P-CCNC: 184 U/L (ref 0–250)
LEUKOCYTE ESTERASE UR QL STRIP: NEGATIVE
LYMPHOCYTES # BLD AUTO: 1.2 10E3/UL (ref 0.8–5.3)
LYMPHOCYTES NFR BLD AUTO: 39 %
Lab: NORMAL
MCH RBC QN AUTO: 29.2 PG (ref 26.5–33)
MCHC RBC AUTO-ENTMCNC: 34 G/DL (ref 31.5–36.5)
MCV RBC AUTO: 86 FL (ref 78–100)
MONOCYTES # BLD AUTO: 0.3 10E3/UL (ref 0–1.3)
MONOCYTES NFR BLD AUTO: 9 %
MUCOUS THREADS #/AREA URNS LPF: PRESENT /LPF
NEUTROPHILS # BLD AUTO: 1.5 10E3/UL (ref 1.6–8.3)
NEUTROPHILS NFR BLD AUTO: 51 %
NITRATE UR QL: NEGATIVE
PERFORMING LABORATORY: NORMAL
PH UR STRIP: 5.5 [PH] (ref 5–8)
PLATELET # BLD AUTO: 274 10E3/UL (ref 150–450)
POTASSIUM SERPL-SCNC: 4.2 MMOL/L (ref 3.4–5.3)
PROT SERPL-MCNC: 7.7 G/DL (ref 6.4–8.3)
RBC # BLD AUTO: 4.14 10E6/UL (ref 3.8–5.2)
RBC #/AREA URNS AUTO: ABNORMAL /HPF
SODIUM SERPL-SCNC: 141 MMOL/L (ref 136–145)
SP GR UR STRIP: 1.02 (ref 1–1.03)
SPECIMEN STATUS: NORMAL
SQUAMOUS #/AREA URNS AUTO: ABNORMAL /LPF
TEST NAME: NORMAL
URATE SERPL-MCNC: 4.4 MG/DL (ref 2.4–5.7)
UROBILINOGEN UR STRIP-ACNC: 0.2 E.U./DL
WBC # BLD AUTO: 2.9 10E3/UL (ref 4–11)
WBC #/AREA URNS AUTO: ABNORMAL /HPF

## 2023-06-12 PROCEDURE — 86355 B CELLS TOTAL COUNT: CPT | Performed by: STUDENT IN AN ORGANIZED HEALTH CARE EDUCATION/TRAINING PROGRAM

## 2023-06-12 PROCEDURE — 87389 HIV-1 AG W/HIV-1&-2 AB AG IA: CPT | Performed by: STUDENT IN AN ORGANIZED HEALTH CARE EDUCATION/TRAINING PROGRAM

## 2023-06-12 PROCEDURE — 81001 URINALYSIS AUTO W/SCOPE: CPT | Performed by: STUDENT IN AN ORGANIZED HEALTH CARE EDUCATION/TRAINING PROGRAM

## 2023-06-12 PROCEDURE — 86357 NK CELLS TOTAL COUNT: CPT | Performed by: STUDENT IN AN ORGANIZED HEALTH CARE EDUCATION/TRAINING PROGRAM

## 2023-06-12 PROCEDURE — 86359 T CELLS TOTAL COUNT: CPT | Performed by: STUDENT IN AN ORGANIZED HEALTH CARE EDUCATION/TRAINING PROGRAM

## 2023-06-12 PROCEDURE — 82784 ASSAY IGA/IGD/IGG/IGM EACH: CPT | Performed by: STUDENT IN AN ORGANIZED HEALTH CARE EDUCATION/TRAINING PROGRAM

## 2023-06-12 PROCEDURE — 86160 COMPLEMENT ANTIGEN: CPT | Performed by: STUDENT IN AN ORGANIZED HEALTH CARE EDUCATION/TRAINING PROGRAM

## 2023-06-12 PROCEDURE — 80053 COMPREHEN METABOLIC PANEL: CPT | Performed by: STUDENT IN AN ORGANIZED HEALTH CARE EDUCATION/TRAINING PROGRAM

## 2023-06-12 PROCEDURE — 86235 NUCLEAR ANTIGEN ANTIBODY: CPT | Performed by: STUDENT IN AN ORGANIZED HEALTH CARE EDUCATION/TRAINING PROGRAM

## 2023-06-12 PROCEDURE — 84999 UNLISTED CHEMISTRY PROCEDURE: CPT | Performed by: STUDENT IN AN ORGANIZED HEALTH CARE EDUCATION/TRAINING PROGRAM

## 2023-06-12 PROCEDURE — 82787 IGG 1 2 3 OR 4 EACH: CPT | Performed by: STUDENT IN AN ORGANIZED HEALTH CARE EDUCATION/TRAINING PROGRAM

## 2023-06-12 PROCEDURE — 85025 COMPLETE CBC W/AUTO DIFF WBC: CPT | Performed by: STUDENT IN AN ORGANIZED HEALTH CARE EDUCATION/TRAINING PROGRAM

## 2023-06-12 PROCEDURE — 86225 DNA ANTIBODY NATIVE: CPT | Performed by: STUDENT IN AN ORGANIZED HEALTH CARE EDUCATION/TRAINING PROGRAM

## 2023-06-12 PROCEDURE — 86140 C-REACTIVE PROTEIN: CPT | Performed by: STUDENT IN AN ORGANIZED HEALTH CARE EDUCATION/TRAINING PROGRAM

## 2023-06-12 PROCEDURE — 84550 ASSAY OF BLOOD/URIC ACID: CPT

## 2023-06-12 PROCEDURE — 83615 LACTATE (LD) (LDH) ENZYME: CPT | Performed by: STUDENT IN AN ORGANIZED HEALTH CARE EDUCATION/TRAINING PROGRAM

## 2023-06-12 PROCEDURE — 82728 ASSAY OF FERRITIN: CPT | Performed by: STUDENT IN AN ORGANIZED HEALTH CARE EDUCATION/TRAINING PROGRAM

## 2023-06-12 PROCEDURE — 86036 ANCA SCREEN EACH ANTIBODY: CPT | Performed by: STUDENT IN AN ORGANIZED HEALTH CARE EDUCATION/TRAINING PROGRAM

## 2023-06-12 PROCEDURE — 85652 RBC SED RATE AUTOMATED: CPT | Performed by: STUDENT IN AN ORGANIZED HEALTH CARE EDUCATION/TRAINING PROGRAM

## 2023-06-12 PROCEDURE — 36415 COLL VENOUS BLD VENIPUNCTURE: CPT | Performed by: STUDENT IN AN ORGANIZED HEALTH CARE EDUCATION/TRAINING PROGRAM

## 2023-06-12 PROCEDURE — 86360 T CELL ABSOLUTE COUNT/RATIO: CPT | Performed by: STUDENT IN AN ORGANIZED HEALTH CARE EDUCATION/TRAINING PROGRAM

## 2023-06-13 LAB
ANCA AB PATTERN SER IF-IMP: NORMAL
C3 SERPL-MCNC: 112 MG/DL (ref 81–157)
C4 SERPL-MCNC: 17 MG/DL (ref 13–39)
DSDNA AB SER-ACNC: 1 IU/ML
ENA SM IGG SER IA-ACNC: <0.7 U/ML
ENA SM IGG SER IA-ACNC: NEGATIVE
ENA SS-A AB SER IA-ACNC: <0.5 U/ML
ENA SS-A AB SER IA-ACNC: NEGATIVE
ENA SS-B IGG SER IA-ACNC: <0.6 U/ML
ENA SS-B IGG SER IA-ACNC: NEGATIVE
HIV 1+2 AB+HIV1 P24 AG SERPL QL IA: NONREACTIVE
IGA SERPL-MCNC: 324 MG/DL (ref 84–499)
IGG SERPL-MCNC: 1703 MG/DL (ref 610–1616)
IGG SERPL-MCNC: 1703 MG/DL (ref 610–1616)
IGG1 SER-MCNC: 1232 MG/DL (ref 382–929)
IGG2 SER-MCNC: 296 MG/DL (ref 242–700)
IGG3 SER-MCNC: 62 MG/DL (ref 22–176)
IGG4 SER-MCNC: 24 MG/DL (ref 4–86)
IGM SERPL-MCNC: 107 MG/DL (ref 35–242)
SUBCLASSES, PERCENT: 95 %
U1 SNRNP IGG SER IA-ACNC: >240 U/ML
U1 SNRNP IGG SER IA-ACNC: POSITIVE

## 2023-06-14 LAB — MAYO MISC RESULT: ABNORMAL

## 2023-06-20 ENCOUNTER — TELEPHONE (OUTPATIENT)
Dept: RHEUMATOLOGY | Facility: CLINIC | Age: 18
End: 2023-06-20
Payer: COMMERCIAL

## 2023-06-20 DIAGNOSIS — R76.8 POSITIVE SM/RNP ANTIBODY: ICD-10-CM

## 2023-06-20 DIAGNOSIS — R76.8 ELEVATED ANTINUCLEAR ANTIBODY (ANA) LEVEL: Primary | ICD-10-CM

## 2023-06-20 NOTE — TELEPHONE ENCOUNTER
Called patient today to discuss results of lab workup.     I saw this patient on 6/5 for recurrent fevers and positive PEDRO >1:1280 as well as some intermittent leukopenia and elevated ESR. Her workup after my visit was notable for a high positive RNP >240, leukopenia with neutropenia, hypergammaglobulinemia (IgG and IgG1 specifically), and low CD8s and slightly high CD19 (percentage only) on the lymphocyte subset.     Her main clinical features were recurrent fevers (though none in last six-eight weeks), weight loss (~20 pounds in 1 year per our records), Raynaud's, and history of GI pain/nausea/occasional emesis though this had improved lately.     Her processes seems most like MCTD at this point given her lab results and clinical picture. Individuals can have significant GI symptoms secondary to dysmotility issues, as well as fevers, weight loss, leukopenia, and hypergammaglobulinemia.     A fecal calprotectin test is still pending for completeness to rule out inflammatory bowel disease is still pending.     If operating under a presumption of MCTD, additional testing is needed including muscle enzymes, myositis panel, EKG, Echo PFTs/CT. No current GERD and previously noted nausea and abdominal episodes are now very infrequent. If worsening, will consider talking to GI again re: possible dysmotility mgmt but this seems like it's better than in the past.     Also briefly discussed starting Plaquenil for her systemic features (fever, weight loss), leukopenia, and hypergammaglobulinemia. We determined to wait until testing is done and a full discussion of possible meds could be had all at once.     Testing is ordered. Asked that things get done within the month. Patient is in agreement with the plan of care. Questions answered.      Alison M. Lerman, MD  Adult and Pediatric Rheumatology Fellow

## 2023-08-21 ENCOUNTER — HOSPITAL ENCOUNTER (OUTPATIENT)
Dept: CT IMAGING | Facility: CLINIC | Age: 18
Discharge: HOME | End: 2023-08-21
Attending: STUDENT IN AN ORGANIZED HEALTH CARE EDUCATION/TRAINING PROGRAM | Admitting: STUDENT IN AN ORGANIZED HEALTH CARE EDUCATION/TRAINING PROGRAM
Payer: COMMERCIAL

## 2023-08-21 ENCOUNTER — LAB (OUTPATIENT)
Dept: LAB | Facility: CLINIC | Age: 18
End: 2023-08-21
Payer: COMMERCIAL

## 2023-08-21 DIAGNOSIS — R76.8 POSITIVE SM/RNP ANTIBODY: ICD-10-CM

## 2023-08-21 DIAGNOSIS — R76.8 ELEVATED ANTINUCLEAR ANTIBODY (ANA) LEVEL: ICD-10-CM

## 2023-08-21 LAB — CK SERPL-CCNC: 126 U/L (ref 26–192)

## 2023-08-21 PROCEDURE — 99000 SPECIMEN HANDLING OFFICE-LAB: CPT

## 2023-08-21 PROCEDURE — 83516 IMMUNOASSAY NONANTIBODY: CPT | Mod: 90

## 2023-08-21 PROCEDURE — 82085 ASSAY OF ALDOLASE: CPT | Mod: 90

## 2023-08-21 PROCEDURE — 84182 PROTEIN WESTERN BLOT TEST: CPT | Mod: 90

## 2023-08-21 PROCEDURE — 82550 ASSAY OF CK (CPK): CPT

## 2023-08-21 PROCEDURE — 86235 NUCLEAR ANTIGEN ANTIBODY: CPT | Mod: 90

## 2023-08-21 PROCEDURE — 71250 CT THORAX DX C-: CPT

## 2023-08-21 PROCEDURE — 86146 BETA-2 GLYCOPROTEIN ANTIBODY: CPT

## 2023-08-21 PROCEDURE — 85730 THROMBOPLASTIN TIME PARTIAL: CPT

## 2023-08-21 PROCEDURE — 86147 CARDIOLIPIN ANTIBODY EA IG: CPT

## 2023-08-21 PROCEDURE — 36415 COLL VENOUS BLD VENIPUNCTURE: CPT

## 2023-08-21 PROCEDURE — 85390 FIBRINOLYSINS SCREEN I&R: CPT | Performed by: PATHOLOGY

## 2023-08-21 PROCEDURE — 85613 RUSSELL VIPER VENOM DILUTED: CPT

## 2023-08-22 ENCOUNTER — HOSPITAL ENCOUNTER (OUTPATIENT)
Dept: CARDIOLOGY | Facility: CLINIC | Age: 18
Discharge: HOME | End: 2023-08-22
Attending: STUDENT IN AN ORGANIZED HEALTH CARE EDUCATION/TRAINING PROGRAM | Admitting: STUDENT IN AN ORGANIZED HEALTH CARE EDUCATION/TRAINING PROGRAM
Payer: COMMERCIAL

## 2023-08-22 DIAGNOSIS — R76.8 ELEVATED ANTINUCLEAR ANTIBODY (ANA) LEVEL: ICD-10-CM

## 2023-08-22 DIAGNOSIS — R76.8 POSITIVE SM/RNP ANTIBODY: ICD-10-CM

## 2023-08-22 LAB
B2 GLYCOPROT1 IGG SERPL IA-ACNC: 1.6 U/ML
B2 GLYCOPROT1 IGM SERPL IA-ACNC: <2.4 U/ML
CARDIOLIPIN IGG SER IA-ACNC: 6.1 GPL-U/ML
CARDIOLIPIN IGG SER IA-ACNC: NEGATIVE
CARDIOLIPIN IGM SER IA-ACNC: 12 MPL-U/ML
CARDIOLIPIN IGM SER IA-ACNC: ABNORMAL
DRVVT SCREEN RATIO: 0.75
INR PPP: 1.07 (ref 0.85–1.15)
LA PPP-IMP: NEGATIVE
LUPUS INTERPRETATION: NORMAL
PTT RATIO: 1.16
THROMBIN TIME: 17 SECONDS (ref 13–19)

## 2023-08-22 PROCEDURE — 93306 TTE W/DOPPLER COMPLETE: CPT

## 2023-08-22 PROCEDURE — 93306 TTE W/DOPPLER COMPLETE: CPT | Mod: 26 | Performed by: INTERNAL MEDICINE

## 2023-08-24 LAB — ALDOLASE SERPL-CCNC: 3.9 U/L

## 2023-08-28 NOTE — TELEPHONE ENCOUNTER
Pharmacy calling to request refill of attached medication on behalf of patient.   Composite Graft Text: The defect edges were debeveled with a #15 scalpel blade. Given the location of the defect, shape of the defect, the proximity to free margins and the fact the defect was full thickness a composite graft was deemed most appropriate.  The defect was outline and then transferred to the donor site.  A full thickness graft was then excised from the donor site. The graft was then placed in the primary defect, oriented appropriately and then sutured into place.  The secondary defect was then repaired using a primary closure.

## 2023-09-06 LAB
ANA SER QL: ABNORMAL
ANNOTATION COMMENT IMP: ABNORMAL
EJ AB SER QL: ABNORMAL
ENA JO1 IGG SER-ACNC: 5 AU/ML
MDA5 AB SER QL LINE BLOT: NEGATIVE
MI2 AB SER QL: ABNORMAL
MJ AB SER QL LINE BLOT: NEGATIVE
OJ AB SER QL: ABNORMAL
PL12 AB SER QL: ABNORMAL
PL7 AB SER QL: ABNORMAL
SAE1 AB SER QL LINE BLOT: NEGATIVE
SRP AB SERPL QL: ABNORMAL
TIF1-GAMMA AB SER QL LINE BLOT: POSITIVE

## 2023-10-13 ENCOUNTER — VIRTUAL VISIT (OUTPATIENT)
Dept: RHEUMATOLOGY | Facility: CLINIC | Age: 18
End: 2023-10-13
Attending: INTERNAL MEDICINE
Payer: COMMERCIAL

## 2023-10-13 VITALS — BODY MASS INDEX: 23.92 KG/M2 | WEIGHT: 135 LBS | HEIGHT: 63 IN

## 2023-10-13 DIAGNOSIS — M35.1 MIXED CONNECTIVE TISSUE DISEASE (H): Primary | ICD-10-CM

## 2023-10-13 DIAGNOSIS — I73.00 RAYNAUD'S DISEASE WITHOUT GANGRENE: ICD-10-CM

## 2023-10-13 PROCEDURE — 99214 OFFICE O/P EST MOD 30 MIN: CPT | Mod: VID | Performed by: INTERNAL MEDICINE

## 2023-10-13 RX ORDER — AMLODIPINE BESYLATE 2.5 MG/1
2.5 TABLET ORAL DAILY
Qty: 90 TABLET | Refills: 1 | Status: SHIPPED | OUTPATIENT
Start: 2023-10-13

## 2023-10-13 RX ORDER — HYDROXYCHLOROQUINE SULFATE 200 MG/1
TABLET, FILM COATED ORAL
Qty: 180 TABLET | Refills: 1 | Status: SHIPPED | OUTPATIENT
Start: 2023-10-13

## 2023-10-13 ASSESSMENT — PAIN SCALES - GENERAL: PAINLEVEL: NO PAIN (0)

## 2023-10-13 ASSESSMENT — PATIENT HEALTH QUESTIONNAIRE - PHQ9: SUM OF ALL RESPONSES TO PHQ QUESTIONS 1-9: 9

## 2023-10-13 NOTE — NURSING NOTE
PHQ-9 score is 9. Pt declined mental health referral.     Is the patient currently in the state of [place]? YES    Visit mode:VIDEO    If the visit is dropped, the patient can be reconnected by: VIDEO VISIT: Text to cell phone:   Telephone Information:   Mobile [phone]       Will anyone else be joining the visit? NO  (If patient encounters technical issues they should call [phone])    How would you like to obtain your AVS? MyChart    Are changes needed to the allergy or medication list? No    Reason for visit: Consult    Medications and allergies have been reviewed.     Bruce NAYAK
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
## 2023-11-20 ENCOUNTER — NURSE TRIAGE (OUTPATIENT)
Dept: NURSING | Facility: CLINIC | Age: 18
End: 2023-11-20
Payer: COMMERCIAL

## 2023-11-21 NOTE — TELEPHONE ENCOUNTER
Patient reporting she was prescribed a medication Amlodipine, and repots she started feeling dizzy and faint shortly after taking it.  She has not taken this medication for a week and hasn't felt dizzy since.    Patient will  who prescribed the medication in the morning.    Akilah Freeman RN  [facility]    Reason for Disposition   [1] Caller has NON-URGENT medicine question about med that PCP prescribed AND [2] triager unable to answer question    Protocols used: Medication Question Call-A-

## 2023-12-08 ENCOUNTER — TELEPHONE (OUTPATIENT)
Dept: RHEUMATOLOGY | Facility: CLINIC | Age: 18
End: 2023-12-08
Payer: COMMERCIAL

## 2023-12-08 NOTE — TELEPHONE ENCOUNTER
[facility] Call [facility]    Phone Message    May a detailed message be left on voicemail: yes     Reason for Call: Other: .     Patient is wanting to get a call back from the nurse Millan. Patient states they have been messaging on [employer] and she would like to speak on the phone. Please advise.     Action Taken: Message routed to:  [facility] ([facility] Rheum    Travel Screening: Not Applicable

## 2023-12-08 NOTE — LETTER
December 8, 2023      Hoa Ames  [address] [place] [address]        To Whom It May Concern,     Hoa Ames has been a patient under the care of Rheumatology at [facility] since June 2023 for management and treatment of the autoimmune diseases - Mixed connective tissue disease and Raynaud's Phenomenon.  These diseases causes occasional fevers that last several days.  Additionally, Hoa may experience fatigue, muscle and joint pain, cold, white/purple skin and numbness of fingers triggered by cold.   Patient is under close monitoring and medication therapy to help curb these diseases.    Please excuse Hoa from work during these disease flares to allow adequate time to rest and recover.       If you have questions or concerns, please call the Rheumatology Clinic [phone].              Sincerely,  Dr. Mg Contreras      Sincerely,        Mg Contreras MD

## 2024-03-14 ENCOUNTER — MYC MEDICAL ADVICE (OUTPATIENT)
Dept: RHEUMATOLOGY | Facility: CLINIC | Age: 19
End: 2024-03-14
Payer: COMMERCIAL

## 2024-03-15 NOTE — TELEPHONE ENCOUNTER
Called pt and left a brief message, will also send a MyChart message.    Leena Joseph RN  Adult Rheumatology Clinic

## 2024-04-09 ENCOUNTER — HOSPITAL ENCOUNTER (EMERGENCY)
Facility: HOSPITAL | Age: 19
Discharge: HOME | End: 2024-04-09
Attending: EMERGENCY MEDICINE | Admitting: EMERGENCY MEDICINE
Payer: OTHER MISCELLANEOUS

## 2024-04-09 ENCOUNTER — APPOINTMENT (OUTPATIENT)
Dept: CT IMAGING | Facility: HOSPITAL | Age: 19
End: 2024-04-09
Attending: EMERGENCY MEDICINE
Payer: OTHER MISCELLANEOUS

## 2024-04-09 ENCOUNTER — APPOINTMENT (OUTPATIENT)
Dept: ULTRASOUND IMAGING | Facility: HOSPITAL | Age: 19
End: 2024-04-09
Attending: EMERGENCY MEDICINE
Payer: OTHER MISCELLANEOUS

## 2024-04-09 VITALS
SYSTOLIC BLOOD PRESSURE: 92 MMHG | BODY MASS INDEX: 23.65 KG/M2 | WEIGHT: 133.5 LBS | OXYGEN SATURATION: 92 % | DIASTOLIC BLOOD PRESSURE: 54 MMHG | HEIGHT: 63 IN | HEART RATE: 67 BPM | TEMPERATURE: 98.5 F | RESPIRATION RATE: 16 BRPM

## 2024-04-09 DIAGNOSIS — M54.41 ACUTE RIGHT-SIDED LOW BACK PAIN WITH RIGHT-SIDED SCIATICA: ICD-10-CM

## 2024-04-09 DIAGNOSIS — S39.011A STRAIN OF ABDOMINAL WALL, INITIAL ENCOUNTER: ICD-10-CM

## 2024-04-09 DIAGNOSIS — R10.31 RIGHT LOWER QUADRANT ABDOMINAL PAIN: ICD-10-CM

## 2024-04-09 LAB
ALBUMIN UR-MCNC: NEGATIVE MG/DL
ANION GAP SERPL CALCULATED.3IONS-SCNC: 8 MMOL/L (ref 7–15)
APPEARANCE UR: CLEAR
BASOPHILS # BLD AUTO: 0 10E3/UL (ref 0–0.2)
BASOPHILS NFR BLD AUTO: 0 %
BILIRUB UR QL STRIP: NEGATIVE
BUN SERPL-MCNC: 12.6 MG/DL (ref 6–20)
CALCIUM SERPL-MCNC: 9.4 MG/DL (ref 8.6–10)
CHLORIDE SERPL-SCNC: 107 MMOL/L (ref 98–107)
COLOR UR AUTO: ABNORMAL
CREAT SERPL-MCNC: 0.6 MG/DL (ref 0.51–0.95)
DEPRECATED HCO3 PLAS-SCNC: 24 MMOL/L (ref 22–29)
EGFRCR SERPLBLD CKD-EPI 2021: >90 ML/MIN/1.73M2
EOSINOPHIL # BLD AUTO: 0.1 10E3/UL (ref 0–0.7)
EOSINOPHIL NFR BLD AUTO: 1 %
ERYTHROCYTE [DISTWIDTH] IN BLOOD BY AUTOMATED COUNT: 12.7 % (ref 10–15)
GLUCOSE SERPL-MCNC: 85 MG/DL (ref 70–99)
GLUCOSE UR STRIP-MCNC: NEGATIVE MG/DL
HCG SERPL QL: NEGATIVE
HCG UR QL: NEGATIVE
HCT VFR BLD AUTO: 36.7 % (ref 35–47)
HGB BLD-MCNC: 12.2 G/DL (ref 11.7–15.7)
HGB UR QL STRIP: NEGATIVE
IMM GRANULOCYTES # BLD: 0 10E3/UL
IMM GRANULOCYTES NFR BLD: 0 %
KETONES UR STRIP-MCNC: NEGATIVE MG/DL
LEUKOCYTE ESTERASE UR QL STRIP: NEGATIVE
LYMPHOCYTES # BLD AUTO: 1.5 10E3/UL (ref 0.8–5.3)
LYMPHOCYTES NFR BLD AUTO: 32 %
MCH RBC QN AUTO: 29.2 PG (ref 26.5–33)
MCHC RBC AUTO-ENTMCNC: 33.2 G/DL (ref 31.5–36.5)
MCV RBC AUTO: 88 FL (ref 78–100)
MONOCYTES # BLD AUTO: 0.5 10E3/UL (ref 0–1.3)
MONOCYTES NFR BLD AUTO: 11 %
MUCOUS THREADS #/AREA URNS LPF: PRESENT /LPF
NEUTROPHILS # BLD AUTO: 2.6 10E3/UL (ref 1.6–8.3)
NEUTROPHILS NFR BLD AUTO: 56 %
NITRATE UR QL: NEGATIVE
NRBC # BLD AUTO: 0 10E3/UL
NRBC BLD AUTO-RTO: 0 /100
PH UR STRIP: 6.5 [PH] (ref 5–7)
PLATELET # BLD AUTO: 274 10E3/UL (ref 150–450)
POTASSIUM SERPL-SCNC: 4.5 MMOL/L (ref 3.4–5.3)
RBC # BLD AUTO: 4.18 10E6/UL (ref 3.8–5.2)
RBC URINE: <1 /HPF
SODIUM SERPL-SCNC: 139 MMOL/L (ref 135–145)
SP GR UR STRIP: 1.02 (ref 1–1.03)
SQUAMOUS EPITHELIAL: 5 /HPF
TRANSITIONAL EPI: <1 /HPF
UROBILINOGEN UR STRIP-MCNC: <2 MG/DL
WBC # BLD AUTO: 4.7 10E3/UL (ref 4–11)
WBC URINE: 2 /HPF

## 2024-04-09 PROCEDURE — 250N000013 HC RX MED GY IP 250 OP 250 PS 637: Performed by: EMERGENCY MEDICINE

## 2024-04-09 PROCEDURE — 84703 CHORIONIC GONADOTROPIN ASSAY: CPT | Performed by: EMERGENCY MEDICINE

## 2024-04-09 PROCEDURE — 96374 THER/PROPH/DIAG INJ IV PUSH: CPT | Mod: 59

## 2024-04-09 PROCEDURE — 81025 URINE PREGNANCY TEST: CPT | Performed by: STUDENT IN AN ORGANIZED HEALTH CARE EDUCATION/TRAINING PROGRAM

## 2024-04-09 PROCEDURE — 74177 CT ABD & PELVIS W/CONTRAST: CPT

## 2024-04-09 PROCEDURE — 36415 COLL VENOUS BLD VENIPUNCTURE: CPT | Performed by: EMERGENCY MEDICINE

## 2024-04-09 PROCEDURE — 80048 BASIC METABOLIC PNL TOTAL CA: CPT | Performed by: EMERGENCY MEDICINE

## 2024-04-09 PROCEDURE — 81001 URINALYSIS AUTO W/SCOPE: CPT | Performed by: EMERGENCY MEDICINE

## 2024-04-09 PROCEDURE — 250N000011 HC RX IP 250 OP 636: Performed by: EMERGENCY MEDICINE

## 2024-04-09 PROCEDURE — 85025 COMPLETE CBC W/AUTO DIFF WBC: CPT | Performed by: EMERGENCY MEDICINE

## 2024-04-09 PROCEDURE — 76830 TRANSVAGINAL US NON-OB: CPT

## 2024-04-09 PROCEDURE — 99285 EMERGENCY DEPT VISIT HI MDM: CPT | Mod: 25

## 2024-04-09 RX ORDER — KETOROLAC TROMETHAMINE 15 MG/ML
15 INJECTION, SOLUTION INTRAMUSCULAR; INTRAVENOUS ONCE
Status: COMPLETED | OUTPATIENT
Start: 2024-04-09 | End: 2024-04-09

## 2024-04-09 RX ORDER — IOPAMIDOL 755 MG/ML
66 INJECTION, SOLUTION INTRAVASCULAR ONCE
Status: COMPLETED | OUTPATIENT
Start: 2024-04-09 | End: 2024-04-09

## 2024-04-09 RX ORDER — ACETAMINOPHEN 325 MG/1
975 TABLET ORAL ONCE
Status: COMPLETED | OUTPATIENT
Start: 2024-04-09 | End: 2024-04-09

## 2024-04-09 RX ORDER — METHYLPREDNISOLONE 4 MG
TABLET, DOSE PACK ORAL
Qty: 21 TABLET | Refills: 0 | Status: SHIPPED | OUTPATIENT
Start: 2024-04-09

## 2024-04-09 RX ADMIN — ACETAMINOPHEN 975 MG: 325 TABLET ORAL at 16:44

## 2024-04-09 RX ADMIN — KETOROLAC TROMETHAMINE 15 MG: 15 INJECTION, SOLUTION INTRAMUSCULAR; INTRAVENOUS at 16:44

## 2024-04-09 RX ADMIN — IOPAMIDOL 66 ML: 755 INJECTION, SOLUTION INTRAVENOUS at 17:35

## 2024-04-09 ASSESSMENT — COLUMBIA-SUICIDE SEVERITY RATING SCALE - C-SSRS
2. HAVE YOU ACTUALLY HAD ANY THOUGHTS OF KILLING YOURSELF IN THE PAST MONTH?: NO
1. IN THE PAST MONTH, HAVE YOU WISHED YOU WERE DEAD OR WISHED YOU COULD GO TO SLEEP AND NOT WAKE UP?: NO
6. HAVE YOU EVER DONE ANYTHING, STARTED TO DO ANYTHING, OR PREPARED TO DO ANYTHING TO END YOUR LIFE?: NO

## 2024-04-09 ASSESSMENT — ACTIVITIES OF DAILY LIVING (ADL)
ADLS_ACUITY_SCORE: 35

## 2024-04-09 NOTE — ED TRIAGE NOTES
Pt presents with RLQ pain since last Saturday at 6/10. No n/v/d and no fevers or chills. Slightly tender to palp. Pt also has right hip pain that she injured at work about one week ago (lifting heavy boxes). Pain is sharp and tingles down the leg at times but not now   Triage Assessment (Adult)       Row Name 04/09/24 1523          Triage Assessment    Airway WDL WDL        Respiratory WDL    Respiratory WDL WDL        Skin Circulation/Temperature WDL    Skin Circulation/Temperature WDL WDL        Cardiac WDL    Cardiac WDL WDL        Peripheral/Neurovascular WDL    Peripheral Neurovascular WDL WDL        Cognitive/Neuro/Behavioral WDL    Cognitive/Neuro/Behavioral WDL WDL

## 2024-04-09 NOTE — ED PROVIDER NOTES
EMERGENCY DEPARTMENT ENCOUNTER      NAME: Hoa Ames  AGE: 19 year old female  YOB: 2005  MRN: [id number]  EVALUATION DATE & TIME: No admission date for patient encounter.    PCP: Varsha Garcia    ED PROVIDER: Pia Rosas MD      Chief Complaint   Patient presents with    Abdominal Pain    right hip pain         FINAL IMPRESSION:  1. Right lower quadrant abdominal pain    2. Acute right-sided low back pain with right-sided sciatica    3. Strain of abdominal wall, initial encounter          ED COURSE & MEDICAL DECISION MAKING:    3:28 PM I met with the patient, obtained history, performed an initial exam, and discussed options and plan for diagnostics and treatment here in the ED.  6:08 PM RN informed the patient had partially removed her IV, is hungry and also wants to leave now that CT scan abdomen/pelvis. I spoke with the patient, and she said she is fine getting the ultrasound. She is also mid eating a meal at this time. I would normally request patient stay NPO until after all imaging is complete.   7:56 PM I was informed by the nurse that the patient left without notifying anyone prior to return of pelvic ultrasound results.  The RN did call the patient to ask why she left and ask where prescriptions may be sent.  The patient did ask that any prescriptions that were being prescribed to be sent to the right pharmacy.  The RN did confirm the pharmacy the patient wanted it to be sent to.  9:43 PM pelvic ultrasound results are returned and no acute findings.    Pertinent Labs & Imaging studies reviewed. (See chart for details)  19 year old female presents to the Emergency Department for evaluation of right lower quadrant abdominal pain that started 3 days ago while lifting heavy boxes.  The pain has persisted, is present with palpation.  She has also had right hip pain radiating into her right lower extremity for the last month.  On exam, pain appears to originate from the right SI  joint.  She has normal range of motion in her right hip without significant tenderness.  I do not suspect an acute fracture or dislocation of her right hip.  Given her right lower quadrant pain tenderness to palpation, will obtain a pelvic ultrasound to evaluate for ovarian pathology and also a CT scan of the abdomen pelvis to evaluate for inguinal hernia, acute appendicitis or other acute abdominal pathology causing her pain.  Also awaiting screening laboratory studies, supportive management with pain medication.    CT scan of the abdomen pelvis is unremarkable.  Pelvic ultrasound results returned after the patient left but there are no acute findings to planed the patient's discomfort.  Patient's pain likely secondary to abdominal wall strain.  Chronic pain in her right hip rating down her right lower extremity likely secondary to sciatic nerve pain.  I did prescribe a Medrol Dosepak and sent it to her pharmacy.    At the conclusion of the encounter I discussed the results of all of the tests and the disposition. The questions were answered. The patient or family acknowledged understanding and was agreeable with the care plan.     Medical Decision Making  Obtained supplemental history:Supplemental history obtained?: No  Reviewed external records: External records reviewed?: Documented in chart  Care impacted by chronic illness:Mental Health  Care significantly affected by social determinants of health:N/A  Did you consider but not order tests?: Work up considered but not performed and documented in chart, if applicable  Did you interpret images independently?: Independent interpretation of ECG and images noted in documentation, when applicable.  Consultation discussion with other provider:Did you involve another provider (consultant, , pharmacy, etc.)?: No  Patient left prior to discharge      MEDICATIONS GIVEN IN THE EMERGENCY:  Medications   acetaminophen (TYLENOL) tablet 975 mg (975 mg Oral $Given 4/9/24  "1644)   ketorolac (TORADOL) injection 15 mg (15 mg Intravenous $Given 4/9/24 1644)   iopamidol (ISOVUE-370) solution 66 mL (66 mLs Intravenous $Given 4/9/24 5195)       NEW PRESCRIPTIONS STARTED AT TODAY'S ER VISIT  Discharge Medication List as of 4/9/2024  8:00 PM        START taking these medications    Details   methylPREDNISolone (MEDROL DOSEPAK) 4 MG tablet therapy pack Follow Package Directions, Disp-21 tablet, R-0, E-Prescribe                =================================================================    HPI    Patient information was obtained from: Patient    Use of : N/A         Hoa Ames is a 19 year old female with a pertinent history of Raynuad disease, recurrent fever, ankylosis of joint of toe, depression, and PTSD who presents to this ED walking for evaluation of abdominal pain and hip pain.     Patient states she has been having stomach pain that started this past Saturday (04/06/24). Mentioned she was lifting some heavy boxes when the pain started and it has been consistent since. Noted nothing helped the pain but palpating the area makes the pain worse. Added the pain has stayed in the same area and has not radiated. Denies fever, chills, sweats, nausea, bowel changes, or urinary changes. Denies previous abdominal surgery. States she is not pregnant.     Patient also stated she has been having intermittent pain in her right hip for about 1 month. Noted it hurts more when she is walking and will occasionally get a sharp pain down her outer thigh as she is walking. Added she will at times feel a \"crack\" in her hip as she is walking that will relieve the pain for a moment, but it then the pain returns afterward.     Per chart review,  patient was seen at [facility] on 06/05/23 for weight loss. Patient has been having consistent fever and illness episodes every few weeks and had been seen by multiple departments and providers with no clear answer. PEDRO " test positive. Instructed to schedule a follow up lab appointment. Patient will get communication when her results are available. Patient acknowledged plan for care and left in stable condition.       PAST MEDICAL HISTORY:  Past Medical History:   Diagnosis Date    Anxiety     Seasonal allergies        PAST SURGICAL HISTORY:  Past Surgical History:   Procedure Laterality Date    deviated septum repair             CURRENT MEDICATIONS:    methylPREDNISolone (MEDROL DOSEPAK) 4 MG tablet therapy pack  amLODIPine (NORVASC) 2.5 MG tablet  amoxicillin-clavulanate (AUGMENTIN-ES) 600-42.9 MG/5ML suspension  hydroxychloroquine (PLAQUENIL) 200 MG tablet  hydrOXYzine (ATARAX) 25 MG tablet  naproxen (NAPROSYN) 500 MG tablet        ALLERGIES:  Allergies   Allergen Reactions    No Known Allergies        FAMILY HISTORY:  Family History   Problem Relation Age of Onset    Suicide Maternal Grandmother     Cancer Other     Lupus No family hx of     Arthritis No family hx of        SOCIAL HISTORY:   Social History     Socioeconomic History    Marital status: Single   Occupational History    Occupation: [employer]   Tobacco Use    Smoking status: Former     Types: Cigarettes     Quit date: 1/1/2021     Years since quitting: 3.2    Smokeless tobacco: Former   Substance and Sexual Activity    Alcohol use: Not Currently     Comment: last drink years ago    Drug use: Not Currently     Comment: once tried LSD    Sexual activity: Yes     Partners: Male     Birth control/protection: Condom   Social History Narrative    Works as a  at [employer]. Has two younger brothers, aged 2 and under 1 year old. Drinks 0-2 times per week, 2-3 drinks each time. No smoking.     Social Determinants of Health     Stress: Stress Concern Present (9/15/2021)    [facility] - Occupational Stress Questionnaire     Feeling of Stress : Very much       VITALS:  BP 92/54   Pulse 67   Temp 98.5  F (36.9  C) (Temporal)   Resp 16   Ht  "1.6 m (5' 3\")   Wt 60.6 kg (133 lb 8 oz)   LMP 03/18/2024   SpO2 92%   BMI 23.65 kg/m      PHYSICAL EXAM    Constitutional: Well developed, Well nourished, NAD  HENT: Normocephalic, Atraumatic, Bilateral external ears normal, Oropharynx normal, mucous membranes moist, Nose normal.   Neck- Normal range of motion, No tenderness, Supple, No stridor.  Eyes: PERRL, EOMI, Conjunctiva normal, No discharge.   Respiratory: Normal breath sounds, No respiratory distress  Cardiovascular: Normal heart rate, Regular rhythm  GI: Bowel sounds normal, Soft, tenderness in right lower quadrant  Musculoskeletal: No edema. Good range of motion in all major joints. No major deformities noted. Mild right SI joint tenderness to palpation.   Integument: Warm, Dry, No erythema, No rash  Neurologic: Alert & oriented x 3, Normal motor function, Normal sensory function, No focal deficits noted. Normal gait.   Psychiatric: Affect normal, Judgment normal, Mood normal.      LAB:  All pertinent labs reviewed and interpreted.  Results for orders placed or performed during the hospital encounter of 04/09/24   CT Abdomen Pelvis w Contrast    Impression    IMPRESSION:   1.  No evidence for appendicitis.  2.  Small amount of free pelvic fluid.  3.  Exam otherwise unremarkable.   US Pelvis Cmplt w Transvag & Doppler LmtPel Duplex Limited    Impression    IMPRESSION:      1.  No evidence of right ovarian torsion.    2.  Involuting 2.2 cm right ovarian follicle, which does not require follow-up.    3.  Normal appearance of the left ovary; Doppler evaluation of the left ovary is limited due to technical factors.    4.  Normal uterus and endometrium.    5.  Debris is incidentally identified within the bladder. Correlate with urinalysis.   HCG qualitative urine   Result Value Ref Range    hCG Urine Qualitative Negative Negative   Basic metabolic panel   Result Value Ref Range    Sodium 139 135 - 145 mmol/L    Potassium 4.5 3.4 - 5.3 mmol/L    Chloride 107 " 98 - 107 mmol/L    Carbon Dioxide (CO2) 24 22 - 29 mmol/L    Anion Gap 8 7 - 15 mmol/L    Urea Nitrogen 12.6 6.0 - 20.0 mg/dL    Creatinine 0.60 0.51 - 0.95 mg/dL    GFR Estimate >90 >60 mL/min/1.73m2    Calcium 9.4 8.6 - 10.0 mg/dL    Glucose 85 70 - 99 mg/dL   HCG qualitative Blood   Result Value Ref Range    hCG Serum Qualitative Negative Negative   UA with Microscopic reflex to Culture    Specimen: Urine, Clean Catch   Result Value Ref Range    Color Urine Light Yellow Colorless, Straw, Light Yellow, Yellow    Appearance Urine Clear Clear    Glucose Urine Negative Negative mg/dL    Bilirubin Urine Negative Negative    Ketones Urine Negative Negative mg/dL    Specific Gravity Urine 1.024 1.001 - 1.030    Blood Urine Negative Negative    pH Urine 6.5 5.0 - 7.0    Protein Albumin Urine Negative Negative mg/dL    Urobilinogen Urine <2.0 <2.0 mg/dL    Nitrite Urine Negative Negative    Leukocyte Esterase Urine Negative Negative    Mucus Urine Present (A) None Seen /LPF    RBC Urine <1 <=2 /HPF    WBC Urine 2 <=5 /HPF    Squamous Epithelials Urine 5 (H) <=1 /HPF    Transitional Epithelials Urine <1 <=1 /HPF   CBC with platelets and differential   Result Value Ref Range    WBC Count 4.7 4.0 - 11.0 10e3/uL    RBC Count 4.18 3.80 - 5.20 10e6/uL    Hemoglobin 12.2 11.7 - 15.7 g/dL    Hematocrit 36.7 35.0 - 47.0 %    MCV 88 78 - 100 fL    MCH 29.2 26.5 - 33.0 pg    MCHC 33.2 31.5 - 36.5 g/dL    RDW 12.7 10.0 - 15.0 %    Platelet Count 274 150 - 450 10e3/uL    % Neutrophils 56 %    % Lymphocytes 32 %    % Monocytes 11 %    % Eosinophils 1 %    % Basophils 0 %    % Immature Granulocytes 0 %    NRBCs per 100 WBC 0 <1 /100    Absolute Neutrophils 2.6 1.6 - 8.3 10e3/uL    Absolute Lymphocytes 1.5 0.8 - 5.3 10e3/uL    Absolute Monocytes 0.5 0.0 - 1.3 10e3/uL    Absolute Eosinophils 0.1 0.0 - 0.7 10e3/uL    Absolute Basophils 0.0 0.0 - 0.2 10e3/uL    Absolute Immature Granulocytes 0.0 <=0.4 10e3/uL    Absolute NRBCs 0.0 10e3/uL        RADIOLOGY:  Reviewed all pertinent imaging. Please see official radiology report.  US Pelvis Cmplt w Transvag & Doppler LmtPel Duplex Limited   Final Result   IMPRESSION:        1.  No evidence of right ovarian torsion.      2.  Involuting 2.2 cm right ovarian follicle, which does not require follow-up.      3.  Normal appearance of the left ovary; Doppler evaluation of the left ovary is limited due to technical factors.      4.  Normal uterus and endometrium.      5.  Debris is incidentally identified within the bladder. Correlate with urinalysis.      CT Abdomen Pelvis w Contrast   Final Result   IMPRESSION:    1.  No evidence for appendicitis.   2.  Small amount of free pelvic fluid.   3.  Exam otherwise unremarkable.            I, Shelli Cuello, am serving as a scribe to document services personally performed by Pia Rosas MD based on my observation and the provider's statements to me. I, Pia Rosas MD, attest that Shelli Cuello is acting in a scribe capacity, has observed my performance of the services and has documented them in accordance with my direction.    Pia Rosas MD  Emergency Medicine  [facility] EMERGENCY DEPARTMENT  [address] [place] [id number]  [phone]       Pia Rosas MD  04/09/24 3380

## 2024-04-10 NOTE — ED NOTES
Pt not in room. Bathrooms and surrounding area checked. Called Pt's phone number. Pt states she had to leave as she had things she needed to do. Confirmed the listed pharmacy was correct for Pt. Notified Dr. Rosas that Pt left AMA.

## 2024-05-21 ENCOUNTER — NURSE TRIAGE (OUTPATIENT)
Dept: NURSING | Facility: CLINIC | Age: 19
End: 2024-05-21
Payer: COMMERCIAL

## 2024-05-21 NOTE — TELEPHONE ENCOUNTER
Nurse Triage SBAR    Is this a 2nd Level Triage? No    Situation/Background: Patient is calling with concern of a 'weird pain in right side of ribs' near the stomach. Patient states she woke up and had a deep pain in entire abdomen when she took a deep inhale. Patient has also felt a constant sharp pain in the right side abdomen X 3 days. Today the sharp pain is not present. Patient states she has also been very fatigued, like she could go to sleep while driving.     Has right hip pain from work injury which is intermittent.     Assessment:   Dull abdominal pain this morning, sharp pain absent  Fatigue while driving today  Is making more phlegm and nausea in the morning - has been present for about one week - will feel nauseas and spit out phlegm  Has some bloating of abdomen, not primary concern  Patient states her stools are tarry looking    Recommendation: Per disposition, Go to ED now. Recommended patient have someone else drive her to ED. Reviewed Care Advice with patient and verbalizes understanding. Declines additional questions. Advised patient to call back with any new or worsening symptoms. Patient verbalized understanding and agrees with plan.    Protocol Recommended Disposition: Emergency department    Анна Garza RN on 5/21/2024 at 6:43 PM  [facility]  Reason for Disposition   Black or tarry bowel movements  (Exception: Chronic-unchanged black-grey BMs AND is taking iron pills or Pepto-Bismol.)    Additional Information   Negative: Shock suspected (e.g., cold/pale/clammy skin, too weak to stand, low BP, rapid pulse)   Negative: Difficult to awaken or acting confused (e.g., disoriented, slurred speech)   Negative: Passed out (i.e., lost consciousness, collapsed and was not responding)   Negative: Sounds like a life-threatening emergency to the triager   Negative: Followed an abdomen (stomach) injury   Negative: Chest pain   Negative: [1] Abdominal pain AND [2] pregnant < 20  "weeks   Negative: [1] Abdominal pain AND [2] pregnant 20 or more weeks   Negative: [1] Abdominal pain AND [2] postpartum (from 0 to 6 weeks after delivery)   Negative: Pain is mainly in upper abdomen  (if needed ask: \"is it mainly above the belly button?\")   Negative: Abdomen bloating or swelling are main symptoms   Negative: [1] SEVERE pain (e.g., excruciating) AND [2] present > 1 hour   Negative: [1] SEVERE pain AND [2] age > 60 years   Negative: [1] Vomiting AND [2] contains red blood or black (\"coffee ground\") material  (Exception: Few red streaks in vomit that only happened once.)   Negative: Blood in bowel movements  (Exception: Blood on surface of BM with constipation.)    Protocols used: Abdominal Pain - Female-A-AH    "

## 2024-07-10 ENCOUNTER — NURSE TRIAGE (OUTPATIENT)
Dept: NURSING | Facility: CLINIC | Age: 19
End: 2024-07-10
Payer: COMMERCIAL

## 2024-07-11 NOTE — TELEPHONE ENCOUNTER
Patient called back.  She was not aware that Dr Contreras had left the practice.  She is looking for new medications for her raynauds and dry mouth. She is advised that she would need to schedule with a new provider.  Scheduled to see Dr Ramos 10/7/24 at [facility].     Анна García RN

## 2024-07-11 NOTE — TELEPHONE ENCOUNTER
Nurse Triage SBAR    Is this a 2nd Level Triage? NO    Situation: medication questions    Background: patient prescribed hydroxychloroquine and amlodipine last October, wants to speak to rheumatology about switching medications    Assessment: NA    Protocol Recommended Disposition:   Call PCP When Office is Open    Recommendation: See care advice     Routed to provider      Reason for Disposition   [1] Caller has NON-URGENT medicine question about med that PCP prescribed AND [2] triager unable to answer question    Protocols used: Medication Question Call-A-AH

## 2024-08-22 ENCOUNTER — NURSE TRIAGE (OUTPATIENT)
Dept: FAMILY MEDICINE | Facility: CLINIC | Age: 19
End: 2024-08-22
Payer: COMMERCIAL

## 2024-08-22 NOTE — TELEPHONE ENCOUNTER
Patient calling to report sx. Patient states she was exposed to mold for several years but has since moved out of that home since May of this year. Patient states she always seems to get these reoccurring issues with her eyes- redness like pink eye. Patient states she would get it then it would resolve and then happen again a few weeks later. Patient asking if this could be related to mold exposure.     Writer explained to patient that it is hard to say exactly if this is the cause.     Patient denies any other symptoms just the RT eye redness/irritation.     Writer advised patient to continue monitoring for now. Try warm compress to the eye a few times a day and if not improved- would recommend she be seen for further evaluation.     Patient agreeable with this plan and thanked for call.

## 2024-10-07 ENCOUNTER — LAB (OUTPATIENT)
Dept: LAB | Facility: CLINIC | Age: 19
End: 2024-10-07
Payer: COMMERCIAL

## 2024-10-07 ENCOUNTER — OFFICE VISIT (OUTPATIENT)
Dept: RHEUMATOLOGY | Facility: CLINIC | Age: 19
End: 2024-10-07
Attending: FAMILY MEDICINE
Payer: COMMERCIAL

## 2024-10-07 VITALS
HEART RATE: 98 BPM | TEMPERATURE: 97.6 F | WEIGHT: 140 LBS | HEIGHT: 63 IN | OXYGEN SATURATION: 99 % | SYSTOLIC BLOOD PRESSURE: 105 MMHG | BODY MASS INDEX: 24.8 KG/M2 | DIASTOLIC BLOOD PRESSURE: 69 MMHG

## 2024-10-07 DIAGNOSIS — I73.00 RAYNAUD'S DISEASE WITHOUT GANGRENE: ICD-10-CM

## 2024-10-07 DIAGNOSIS — R76.8 RHEUMATOID FACTOR POSITIVE: ICD-10-CM

## 2024-10-07 DIAGNOSIS — R76.8 ANTI-RNP ANTIBODIES PRESENT: ICD-10-CM

## 2024-10-07 DIAGNOSIS — Z79.899 LONG-TERM USE OF HIGH-RISK MEDICATION: ICD-10-CM

## 2024-10-07 DIAGNOSIS — M35.1 MIXED CONNECTIVE TISSUE DISEASE (H): ICD-10-CM

## 2024-10-07 DIAGNOSIS — R76.0 POSITIVE CARDIOLIPIN ANTIBODIES: ICD-10-CM

## 2024-10-07 DIAGNOSIS — R76.8 POSITIVE ANA (ANTINUCLEAR ANTIBODY): ICD-10-CM

## 2024-10-07 DIAGNOSIS — M35.1 MIXED CONNECTIVE TISSUE DISEASE (H): Primary | ICD-10-CM

## 2024-10-07 LAB
ALBUMIN SERPL BCG-MCNC: 4 G/DL (ref 3.5–5.2)
ALBUMIN UR-MCNC: NEGATIVE MG/DL
ALP SERPL-CCNC: 61 U/L (ref 40–150)
ALT SERPL W P-5'-P-CCNC: 10 U/L (ref 0–50)
ANION GAP SERPL CALCULATED.3IONS-SCNC: 10 MMOL/L (ref 7–15)
APPEARANCE UR: CLEAR
AST SERPL W P-5'-P-CCNC: 28 U/L (ref 0–45)
BASOPHILS # BLD AUTO: 0 10E3/UL (ref 0–0.2)
BASOPHILS NFR BLD AUTO: 0 %
BILIRUB SERPL-MCNC: 0.2 MG/DL
BILIRUB UR QL STRIP: NEGATIVE
BUN SERPL-MCNC: 12.8 MG/DL (ref 6–20)
CALCIUM SERPL-MCNC: 9.2 MG/DL (ref 8.8–10.4)
CANCER AG125 SERPL-ACNC: 9 U/ML
CEA SERPL-MCNC: 1 NG/ML
CHLORIDE SERPL-SCNC: 104 MMOL/L (ref 98–107)
CK SERPL-CCNC: 141 U/L (ref 26–192)
COLOR UR AUTO: YELLOW
CREAT SERPL-MCNC: 0.52 MG/DL (ref 0.51–0.95)
CRP SERPL-MCNC: <3 MG/L
EGFRCR SERPLBLD CKD-EPI 2021: >90 ML/MIN/1.73M2
EOSINOPHIL # BLD AUTO: 0.1 10E3/UL (ref 0–0.7)
EOSINOPHIL NFR BLD AUTO: 1 %
ERYTHROCYTE [DISTWIDTH] IN BLOOD BY AUTOMATED COUNT: 12.4 % (ref 10–15)
GLUCOSE SERPL-MCNC: 93 MG/DL (ref 70–99)
GLUCOSE UR STRIP-MCNC: NEGATIVE MG/DL
HCO3 SERPL-SCNC: 22 MMOL/L (ref 22–29)
HCT VFR BLD AUTO: 37.4 % (ref 35–47)
HGB BLD-MCNC: 12.4 G/DL (ref 11.7–15.7)
HGB UR QL STRIP: NEGATIVE
IMM GRANULOCYTES # BLD: 0 10E3/UL
IMM GRANULOCYTES NFR BLD: 0 %
KETONES UR STRIP-MCNC: NEGATIVE MG/DL
LEUKOCYTE ESTERASE UR QL STRIP: NEGATIVE
LYMPHOCYTES # BLD AUTO: 1.2 10E3/UL (ref 0.8–5.3)
LYMPHOCYTES NFR BLD AUTO: 30 %
MCH RBC QN AUTO: 29 PG (ref 26.5–33)
MCHC RBC AUTO-ENTMCNC: 33.2 G/DL (ref 31.5–36.5)
MCV RBC AUTO: 88 FL (ref 78–100)
MONOCYTES # BLD AUTO: 0.5 10E3/UL (ref 0–1.3)
MONOCYTES NFR BLD AUTO: 11 %
NEUTROPHILS # BLD AUTO: 2.3 10E3/UL (ref 1.6–8.3)
NEUTROPHILS NFR BLD AUTO: 57 %
NITRATE UR QL: NEGATIVE
PH UR STRIP: 5.5 [PH] (ref 5–7)
PLATELET # BLD AUTO: 308 10E3/UL (ref 150–450)
POTASSIUM SERPL-SCNC: 4.2 MMOL/L (ref 3.4–5.3)
PROT SERPL-MCNC: 7.6 G/DL (ref 6.4–8.3)
RBC # BLD AUTO: 4.27 10E6/UL (ref 3.8–5.2)
SODIUM SERPL-SCNC: 136 MMOL/L (ref 135–145)
SP GR UR STRIP: >=1.03 (ref 1–1.03)
UROBILINOGEN UR STRIP-ACNC: 0.2 E.U./DL
WBC # BLD AUTO: 4 10E3/UL (ref 4–11)

## 2024-10-07 PROCEDURE — G0463 HOSPITAL OUTPT CLINIC VISIT: HCPCS | Performed by: INTERNAL MEDICINE

## 2024-10-07 PROCEDURE — 82550 ASSAY OF CK (CPK): CPT

## 2024-10-07 PROCEDURE — 86301 IMMUNOASSAY TUMOR CA 19-9: CPT | Mod: 90

## 2024-10-07 PROCEDURE — 86200 CCP ANTIBODY: CPT

## 2024-10-07 PROCEDURE — 99000 SPECIMEN HANDLING OFFICE-LAB: CPT

## 2024-10-07 PROCEDURE — 80053 COMPREHEN METABOLIC PANEL: CPT

## 2024-10-07 PROCEDURE — 85025 COMPLETE CBC W/AUTO DIFF WBC: CPT

## 2024-10-07 PROCEDURE — 86160 COMPLEMENT ANTIGEN: CPT

## 2024-10-07 PROCEDURE — 99214 OFFICE O/P EST MOD 30 MIN: CPT | Performed by: INTERNAL MEDICINE

## 2024-10-07 PROCEDURE — 86304 IMMUNOASSAY TUMOR CA 125: CPT

## 2024-10-07 PROCEDURE — 86225 DNA ANTIBODY NATIVE: CPT

## 2024-10-07 PROCEDURE — 81003 URINALYSIS AUTO W/O SCOPE: CPT

## 2024-10-07 PROCEDURE — 36415 COLL VENOUS BLD VENIPUNCTURE: CPT

## 2024-10-07 PROCEDURE — G2211 COMPLEX E/M VISIT ADD ON: HCPCS | Performed by: INTERNAL MEDICINE

## 2024-10-07 PROCEDURE — 82378 CARCINOEMBRYONIC ANTIGEN: CPT

## 2024-10-07 PROCEDURE — 86140 C-REACTIVE PROTEIN: CPT

## 2024-10-07 ASSESSMENT — PAIN SCALES - GENERAL: PAINLEVEL: MODERATE PAIN (4)

## 2024-10-07 NOTE — NURSING NOTE
"Chief Complaint   Patient presents with    New Patient     Was under Dr Contreras's care       /69   Pulse 98   Temp 97.6  F (36.4  C) (Oral)   Ht 1.6 m (5' 3\")   Wt 63.5 kg (140 lb)   SpO2 99%   BMI 24.80 kg/m    Luna Miranda MA on 10/7/2024 at 10:27 AM    "

## 2024-10-07 NOTE — PROGRESS NOTES
Chief Complaint/Reason for Visit: MCTD    HPI:    Hoa Ames is a 19 year old  female with past medical history listed below.  She did not take HCQ as she was afraid of visual loss. She says she has blurry vision at night. She said she took amlodipine which made her dizzy and was about to faint. She says her raynaud's has gotten worse over the years. She wears protective gloves. She works at [employer]. She had a fever 101 F a week ago along with body aches, took tylenol. Fever went away in a day but body ache persisted. The body ache is now gone. Has joint pain of knees and elbows, no swelling. Morning stiffness lasts for whole day on some days and other days she does not have any pain.     REVIEW OF SYSTEMS    General - pos for fatigue  HEENT - neg for oral or nasal ulcers, pos for dry mouth and neg for dry eyes, blurry vision at night   Cardiovascular - neg for chest pain or palpitations, neg for serositis  Respiratory - neg for sob or cough  Gastrointestinal - neg for blood in stool., no abdominal pain ( had in the past which self resolved), neg for nausea or vomiting  Genitourinary - neg for blood in urine   Skin - pos for malar rash at the time she gets a fever, she has had fever only twice this year and in the past years, she had it febrile episodes every month   Neuro - neg for weakness, numbness or tingling   Hematologic - pos for easy bruising, no blood clots  Psych - pos for anxiety, on atarax  OBS/GYN- does not plan on conceiving this year. History of 1 miscarriage in the first trimester  Pregnant? No, she is sexually active       Past Medical History:   Diagnosis Date    Anxiety     Seasonal allergies      Past Surgical History:   Procedure Laterality Date    deviated septum repair       Family History   Problem Relation Age of Onset    Suicide Maternal Grandmother     Cancer Other     Lupus No family hx of     Arthritis No family hx of      Social History     Socioeconomic  History    Marital status: Single   Occupational History    Occupation: [employer]   Tobacco Use    Smoking status: Former     Current packs/day: 0.00     Types: Cigarettes     Quit date: 1/1/2021     Years since quitting: 3.7    Smokeless tobacco: Former   Substance and Sexual Activity    Alcohol use: Not Currently     Comment: last drink years ago    Drug use: Not Currently     Comment: once tried LSD    Sexual activity: Yes     Partners: Male     Birth control/protection: Condom   Social History Narrative    Works as a  at [employer]. Has two younger brothers, aged 2 and under 1 year old. Drinks 0-2 times per week, 2-3 drinks each time. No smoking.     Social Determinants of Health     Stress: Stress Concern Present (9/15/2021)    [employer] - Occupational Stress Questionnaire     Feeling of Stress : Very much       Allergies   Allergen Reactions    No Known Allergies        Current Outpatient Medications   Medication Sig Dispense Refill    amLODIPine (NORVASC) 2.5 MG tablet Take 1 tablet (2.5 mg) by mouth daily 90 tablet 1    amoxicillin-clavulanate (AUGMENTIN-ES) 600-42.9 MG/5ML suspension Take 8.3 mLs (1,000 mg) by mouth 2 times daily (Patient not taking: Reported on 6/5/2023) 166 mL 0    hydroxychloroquine (PLAQUENIL) 200 MG tablet Take one tab by mouth each morning and evening Monday through Friday. Do not take on Saturday or Sunday. Annual Plaquenil toxicity eye screening required. 180 tablet 1    hydrOXYzine (ATARAX) 25 MG tablet TAKE 1 TABLET(25 MG) BY MOUTH THREE TIMES DAILY AS NEEDED FOR ANXIETY 90 tablet 1    methylPREDNISolone (MEDROL DOSEPAK) 4 MG tablet therapy pack Follow Package Directions 21 tablet 0    naproxen (NAPROSYN) 500 MG tablet TAKE 1 TABLET(500 MG) BY MOUTH TWICE DAILY AS NEEDED FOR MODERATE PAIN 60 tablet 1     No current facility-administered medications for this visit.       PHYSICAL EXAM    There were no vitals taken for this visit.      General:  Alert, No apparent distress   Psych: Affect euthymic  Eyes: Sclera noninjected  Ears, Nose, Throat, Mouth: Oral mucosa moist with normal salivary pool  Skin: No rashes noted  Neck: No lymphadenopathy  Cardiovascular: Regular rate and rhythm, Normal S1 and S2 and No murmurs, rubs or gallops  Respiratory: Clear to auscultation with no wheezing or crackles  Abdomen: Soft, nontender, nondistended   Neuro: Normal gait and Able to arise from seated position unassisted  Musculoskeletal: Hands:  Normal. No raynaud's or synovitis   Wrists:  Normal.  Elbows:  Normal.  Shoulders:  Normal.  Feet:  Normal.  Ankles:  Normal.  Knees:  Normal.      LABS   Reviewed as below.     April 2024  CBC - normal   BMP - normal creatinine     2023  CK, Aldolase normal   Beta 2 GP neg   LAC neg   Cardiolipin IgM weakly pos at 12     Lauren-1 (Histidyl-tRNA Synthetase) Ab, IgG  0 - 40 AU/mL 5    Comment: INTERPRETIVE INFORMATION:  Lauren-1 Antibody, IgG      29 AU/mL or less.........Negative    30-40 AU/mL..............Equivocal    41 AU/mL or greater......Positive    Presence of Lauren-1 (antihistidyl transfer RNA [t-RNA]  synthetase) antibody is associated with polymyositis and  may also be seen in patients with dermatomyositis. Lauren-1  antibody is associated with pulmonary involvement  (interstitial lung disease), Raynaud phenomenon, arthritis,  and 's hands (implicated in antisynthetase  syndrome).    PL-12 (alanyl-tRNA synthetase) Antibody  Negative See Note    PL-7 (threonyl-tRNA synthetase) Antibody  Negative See Note    EJ (glycyl - tRNA synthetase) Antibody  Negative See Note    OJ (isoleucyl-tRNA synthetase) Antibody  Negative See Note    SRP (Signal Recognition Particle) Ab  Negative See Note    Mi-2 (nuclrear helicase protein) Antibody  Negative See Note    P155/140 (TIF1-gamma) Antibody  Negative Weak Positive Abnormal    Comment: Performed By: [facility]  [address] [place] [id number]  : Jose PLEITEZ  MD Oh, PhD  CLIA Number: [id number]    TIF-1 gamma (155 kDa) Ab  Negative Positive Abnormal     SAE1 (SUMO activating enzyme) Ab  Negative Negative    MDA5 (CADM-140) Ab  Negative Negative    NXP2 (Nuclear matrix protein-2) Ab  Negative Negative    Myositis Interpretive Information See Note   Comment: Incidental finding of bands suggestive of U1 RNP observed  by immunoprecipitation.  Recommend testing for Schafer/RNP  (JOSEPH) Antibody, IgG (test code [id number]) if clinically  indicated.     Ds DNA neg   C3, C4 normal     June 2023  LFT normal  ANCA neg     RNP Fatimah IgG Instrument Value  <5.0 U/mL >240.0 High      RNP Antibody IgG  Negative Positive Abnormal     Schafer JOSEPH Fatimah IgG Instrument Value  <7.0 U/mL <0.7    Smith JOSEPH Antibody IgG  Negative Negative    SSA Fatimah IgG Instrument Value  <7.0 U/mL <0.5    SSA (Ro) Antibody IgG  Negative Negative    SSB Fatimah IgG Instrument Value  <7.0 U/mL <0.6    SSB (La) Antibody IgG  Negative Negative     Lab Results   Component Value Date    Rheumatoid Factor 15 02/27/2023     PEDRO interpretation  Negative Positive Abnormal     Comment:  Negative:              <1:40  Borderline Positive:   1:40 - 1:80  Positive:              >1:80    PEDRO pattern 1 Speckled    PEDRO titer 1 >1:1280      Latest Reference Range & Units 09/14/21 15:36 06/12/23 13:10   Hepatitis C Antibody Negative  Negative    HIV Antigen Antibody Combo Nonreactive  Negative Nonreactive       ASSESSMENT      1. Mixed connective tissue disease (H)    2. Positive PEDRO (antinuclear antibody)    3. Anti-RNP antibodies present    4. Positive cardiolipin antibodies    5. Raynaud's disease without gangrene    6. Rheumatoid factor positive    7. Long-term use of high-risk medication        (M35.1) Mixed connective tissue disease (H)  (primary encounter diagnosis)  (R76.8) Positive PEDRO (antinuclear antibody)  (R76.8) Anti-RNP antibodies present  Comment: Former patient of . pos PEDRO 1:1280, speckled. Pos RNP and weakly  pos anti cardiolipin x1. Clinical features include fever ( now reduced in frequency, previously used to be once per month), raynaud's and multiple joint pain without swelling. History of 1 miscarriage in 1st trimester. She tried amlodipine for raynaud's but could not tolerate due to dizziness.  Plan:   Continue  mg daily.   Annual retinal exam by Ophthalmology   Recommend protective gloves.   Recommend nitroglycerin paste for raynaud's    (R76.0) Positive cardiolipin antibodies  Comment: noted  Plan: monitor     (I73.00) Raynaud's disease without gangrene  Comment: noted   Plan: as above     (R76.8) Rheumatoid factor positive  Comment: RF 15, no clinical features to suggest inflammatory arthritis at this time.   Plan: check CCP     (Z79.899) Long-term use of high-risk medication  Comment: HCQ  Plan: have explained potential side effects of Plaquenil including but not limited to retinal toxicity, need for annual retinal eye exams by an ophthalmologist, skin pigmentation, possible QTc prolongation and cause for cardiac arrhythmias especially with medication interactions, possible GI upset, possible muscle weakness.     Follow up - 3 months     I spent 35 minutes on the date of the encounter doing chart review, history and exam, documentation and orders per the note.    BRIAN MORSE MD    Division of Rheumatic & Autoimmune Diseases  [facility]

## 2024-10-07 NOTE — LETTER
10/7/2024       RE: Hoa Ames  [address] [place] [facility] [address]     Dear Colleague,    Thank you for referring your patient, Hoa Ames, to the [facility] at [facility]. Please see a copy of my visit note below.                           Chief Complaint/Reason for Visit: MCTD    HPI:    Hoa Ames is a 19 year old  female with past medical history listed below.  She did not take HCQ as she was afraid of visual loss. She says she has blurry vision at night. She said she took amlodipine which made her dizzy and was about to faint. She says her raynaud's has gotten worse over the years. She wears protective gloves. She works at [employer]. She had a fever 101 F a week ago along with body aches, took tylenol. Fever went away in a day but body ache persisted. The body ache is now gone. Has joint pain of knees and elbows, no swelling. Morning stiffness lasts for whole day on some days and other days she does not have any pain.     REVIEW OF SYSTEMS    General - pos for fatigue  HEENT - neg for oral or nasal ulcers, pos for dry mouth and neg for dry eyes, blurry vision at night   Cardiovascular - neg for chest pain or palpitations, neg for serositis  Respiratory - neg for sob or cough  Gastrointestinal - neg for blood in stool., no abdominal pain ( had in the past which self resolved), neg for nausea or vomiting  Genitourinary - neg for blood in urine   Skin - pos for malar rash at the time she gets a fever, she has had fever only twice this year and in the past years, she had it febrile episodes every month   Neuro - neg for weakness, numbness or tingling   Hematologic - pos for easy bruising, no blood clots  Psych - pos for anxiety, on atarax  OBS/GYN- does not plan on conceiving this year. History of 1 miscarriage in the first trimester  Pregnant? No, she is sexually active       Past Medical History:   Diagnosis Date      Anxiety      Seasonal allergies      Past Surgical History:   Procedure Laterality Date     deviated septum repair       Family History   Problem Relation Age of Onset     Suicide Maternal Grandmother      Cancer Other      Lupus No family hx of      Arthritis No family hx of      Social History     Socioeconomic History     Marital status: Single   Occupational History     Occupation: [employer]   Tobacco Use     Smoking status: Former     Current packs/day: 0.00     Types: Cigarettes     Quit date: 1/1/2021     Years since quitting: 3.7     Smokeless tobacco: Former   Substance and Sexual Activity     Alcohol use: Not Currently     Comment: last drink years ago     Drug use: Not Currently     Comment: once tried LSD     Sexual activity: Yes     Partners: Male     Birth control/protection: Condom   Social History Narrative    Works as a  at [employer]. Has two younger brothers, aged 2 and under 1 year old. Drinks 0-2 times per week, 2-3 drinks each time. No smoking.     Social Determinants of Health     Stress: Stress Concern Present (9/15/2021)    [facility] [employer] - Occupational Stress Questionnaire      Feeling of Stress : Very much       Allergies   Allergen Reactions     No Known Allergies        Current Outpatient Medications   Medication Sig Dispense Refill     amLODIPine (NORVASC) 2.5 MG tablet Take 1 tablet (2.5 mg) by mouth daily 90 tablet 1     amoxicillin-clavulanate (AUGMENTIN-ES) 600-42.9 MG/5ML suspension Take 8.3 mLs (1,000 mg) by mouth 2 times daily (Patient not taking: Reported on 6/5/2023) 166 mL 0     hydroxychloroquine (PLAQUENIL) 200 MG tablet Take one tab by mouth each morning and evening Monday through Friday. Do not take on Saturday or Sunday. Annual Plaquenil toxicity eye screening required. 180 tablet 1     hydrOXYzine (ATARAX) 25 MG tablet TAKE 1 TABLET(25 MG) BY MOUTH THREE TIMES DAILY AS NEEDED FOR ANXIETY 90 tablet 1     methylPREDNISolone (MEDROL DOSEPAK)  4 MG tablet therapy pack Follow Package Directions 21 tablet 0     naproxen (NAPROSYN) 500 MG tablet TAKE 1 TABLET(500 MG) BY MOUTH TWICE DAILY AS NEEDED FOR MODERATE PAIN 60 tablet 1     No current facility-administered medications for this visit.       PHYSICAL EXAM    There were no vitals taken for this visit.      General: Alert, No apparent distress   Psych: Affect euthymic  Eyes: Sclera noninjected  Ears, Nose, Throat, Mouth: Oral mucosa moist with normal salivary pool  Skin: No rashes noted  Neck: No lymphadenopathy  Cardiovascular: Regular rate and rhythm, Normal S1 and S2 and No murmurs, rubs or gallops  Respiratory: Clear to auscultation with no wheezing or crackles  Abdomen: Soft, nontender, nondistended   Neuro: Normal gait and Able to arise from seated position unassisted  Musculoskeletal: Hands:  Normal. No raynaud's or synovitis   Wrists:  Normal.  Elbows:  Normal.  Shoulders:  Normal.  Feet:  Normal.  Ankles:  Normal.  Knees:  Normal.      LABS   Reviewed as below.     April 2024  CBC - normal   BMP - normal creatinine     2023  CK, Aldolase normal   Beta 2 GP neg   LAC neg   Cardiolipin IgM weakly pos at 12     Lauren-1 (Histidyl-tRNA Synthetase) Ab, IgG  0 - 40 AU/mL 5    Comment: INTERPRETIVE INFORMATION:  Lauren-1 Antibody, IgG      29 AU/mL or less.........Negative    30-40 AU/mL..............Equivocal    41 AU/mL or greater......Positive    Presence of Lauren-1 (antihistidyl transfer RNA [t-RNA]  synthetase) antibody is associated with polymyositis and  may also be seen in patients with dermatomyositis. Lauren-1  antibody is associated with pulmonary involvement  (interstitial lung disease), Raynaud phenomenon, arthritis,  and 's hands (implicated in antisynthetase  syndrome).    PL-12 (alanyl-tRNA synthetase) Antibody  Negative See Note    PL-7 (threonyl-tRNA synthetase) Antibody  Negative See Note    EJ (glycyl - tRNA synthetase) Antibody  Negative See Note    OJ (isoleucyl-tRNA synthetase)  Antibody  Negative See Note    SRP (Signal Recognition Particle) Ab  Negative See Note    Mi-2 (nuclrear helicase protein) Antibody  Negative See Note    P155/140 (TIF1-gamma) Antibody  Negative Weak Positive Abnormal    Comment: Performed By: [facility]  [address] [place] [id number]  : Jose Casiano MD, PhD  CLIA Number: [id number]    TIF-1 gamma (155 kDa) Ab  Negative Positive Abnormal     SAE1 (SUMO activating enzyme) Ab  Negative Negative    MDA5 (CADM-140) Ab  Negative Negative    NXP2 (Nuclear matrix protein-2) Ab  Negative Negative    Myositis Interpretive Information See Note   Comment: Incidental finding of bands suggestive of U1 RNP observed  by immunoprecipitation.  Recommend testing for Schafer/RNP  (JOSEPH) Antibody, IgG (test code [id number]) if clinically  indicated.     Ds DNA neg   C3, C4 normal     June 2023  LFT normal  ANCA neg     RNP Fatimah IgG Instrument Value  <5.0 U/mL >240.0 High      RNP Antibody IgG  Negative Positive Abnormal     Schafer JOSEPH Fatimah IgG Instrument Value  <7.0 U/mL <0.7    Smith JOSEPH Antibody IgG  Negative Negative    SSA Fatimah IgG Instrument Value  <7.0 U/mL <0.5    SSA (Ro) Antibody IgG  Negative Negative    SSB Fatimah IgG Instrument Value  <7.0 U/mL <0.6    SSB (La) Antibody IgG  Negative Negative     Lab Results   Component Value Date    Rheumatoid Factor 15 02/27/2023     PEDRO interpretation  Negative Positive Abnormal     Comment:  Negative:              <1:40  Borderline Positive:   1:40 - 1:80  Positive:              >1:80    PEDRO pattern 1 Speckled    PEDRO titer 1 >1:1280      Latest Reference Range & Units 09/14/21 15:36 06/12/23 13:10   Hepatitis C Antibody Negative  Negative    HIV Antigen Antibody Combo Nonreactive  Negative Nonreactive       ASSESSMENT      1. Mixed connective tissue disease (H)    2. Positive PEDRO (antinuclear antibody)    3. Anti-RNP antibodies present    4. Positive cardiolipin antibodies    5. Raynaud's disease without  gangrene    6. Rheumatoid factor positive    7. Long-term use of high-risk medication        (M35.1) Mixed connective tissue disease (H)  (primary encounter diagnosis)  (R76.8) Positive PEDRO (antinuclear antibody)  (R76.8) Anti-RNP antibodies present  Comment: Former patient of . pos PEDRO 1:1280, speckled. Pos RNP and weakly pos anti cardiolipin x1. Clinical features include fever ( now reduced in frequency, previously used to be once per month), raynaud's and multiple joint pain without swelling. History of 1 miscarriage in 1st trimester. She tried amlodipine for raynaud's but could not tolerate due to dizziness.  Plan:   Continue  mg daily.   Annual retinal exam by Ophthalmology   Recommend protective gloves.   Recommend nitroglycerin paste for raynaud's    (R76.0) Positive cardiolipin antibodies  Comment: noted  Plan: monitor     (I73.00) Raynaud's disease without gangrene  Comment: noted   Plan: as above     (R76.8) Rheumatoid factor positive  Comment: RF 15, no clinical features to suggest inflammatory arthritis at this time.   Plan: check CCP     (Z79.899) Long-term use of high-risk medication  Comment: HCQ  Plan: have explained potential side effects of Plaquenil including but not limited to retinal toxicity, need for annual retinal eye exams by an ophthalmologist, skin pigmentation, possible QTc prolongation and cause for cardiac arrhythmias especially with medication interactions, possible GI upset, possible muscle weakness.     Follow up - 3 months     I spent 35 minutes on the date of the encounter doing chart review, history and exam, documentation and orders per the note.    ALAINA MORSE MD    Division of Rheumatic & Autoimmune Diseases  [facility]       Again, thank you for allowing me to participate in the care of your patient.      Sincerely,    Alaina Morse MD

## 2024-10-08 LAB
C3 SERPL-MCNC: 115 MG/DL (ref 81–157)
C4 SERPL-MCNC: 19 MG/DL (ref 13–39)

## 2024-10-09 LAB
CANCER AG19-9 SERPL IA-ACNC: 6 U/ML
CCP AB SER IA-ACNC: 0.8 U/ML
DSDNA AB SER-ACNC: 0.9 IU/ML

## 2024-10-17 ENCOUNTER — MYC MEDICAL ADVICE (OUTPATIENT)
Dept: RHEUMATOLOGY | Facility: CLINIC | Age: 19
End: 2024-10-17
Payer: COMMERCIAL

## 2024-10-17 DIAGNOSIS — I73.00 RAYNAUD'S DISEASE WITHOUT GANGRENE: Primary | ICD-10-CM

## 2024-10-17 DIAGNOSIS — M35.1 MIXED CONNECTIVE TISSUE DISEASE (H): ICD-10-CM

## 2024-10-22 NOTE — TELEPHONE ENCOUNTER
Call to patient to clarify her MyChart message.    After some thought, pt states she would like to start Plaquenil. Pt verbalizes understanding need for yearly plaquenil toxicity eye exam with ophthalmology and agrees to follow- up with referral for this.     Pt also requesting prescription for nitroglycerin cream.    As per Dr Ramos's plan on 10/07/2024:  Continue  mg daily. Annual retinal exam by Ophthalmology Recommend protective gloves. Recommend nitroglycerin paste for raynaud's     Prescription pended and sent to provider for approval      Marcela Cantu RN

## 2024-10-25 RX ORDER — HYDROXYCHLOROQUINE SULFATE 200 MG/1
200 TABLET, FILM COATED ORAL DAILY
Qty: 90 TABLET | Refills: 0 | Status: SHIPPED | OUTPATIENT
Start: 2024-10-25

## 2024-10-31 ENCOUNTER — TELEPHONE (OUTPATIENT)
Dept: RHEUMATOLOGY | Facility: CLINIC | Age: 19
End: 2024-10-31
Payer: COMMERCIAL

## 2024-10-31 DIAGNOSIS — M35.1 MIXED CONNECTIVE TISSUE DISEASE (H): Primary | ICD-10-CM

## 2024-10-31 DIAGNOSIS — I73.00 RAYNAUD'S DISEASE WITHOUT GANGRENE: ICD-10-CM

## 2024-11-28 ENCOUNTER — MYC MEDICAL ADVICE (OUTPATIENT)
Dept: RHEUMATOLOGY | Facility: CLINIC | Age: 19
End: 2024-11-28
Payer: COMMERCIAL

## 2024-11-28 NOTE — LETTER
12/12/2024       RE: Hoa Ames  [address] [place] [address]     To Whom It May Concern,        Hoa Ames has been a patient under the care of Rheumatology at [facility] since June 2023 for management and treatment of the autoimmune diseases - Mixed connective tissue disease and Raynaud's Phenomenon.  These diseases causes occasional fevers that last several days.  Additionally, Hoa may experience fatigue, muscle and joint pain, cold, white/purple skin and numbness of fingers triggered by cold.  Patient is under close monitoring and medication therapy to help curb these diseases.   Please allow Hoa to work in a warmer area of the building during the winter months and as needed due to cold temperatures which trigger her autoimmune disease symptoms.  If you have questions or concerns, please call the Rheumatology Clinic [phone].              Sincerely,     BRIAN MORSE MD

## 2024-11-29 NOTE — TELEPHONE ENCOUNTER
Call to Hoa to discuss her continue Raynauds symptoms despite applying nitroglycerin paste.   Pt states paste was working until it started getting colder. Pt also states 0.5inch of paste is not enough to cover both feet and hands. Pt is also taking plaquenil.     Looking for more/better treatment for her raynaud's.    Routed to provider to advise.    Marcela Cantu RN

## 2024-12-01 DIAGNOSIS — I73.00 RAYNAUD'S DISEASE WITHOUT GANGRENE: Primary | ICD-10-CM

## 2024-12-01 RX ORDER — NIFEDIPINE 30 MG
30 TABLET, EXTENDED RELEASE ORAL DAILY
Qty: 90 TABLET | Refills: 1 | Status: SHIPPED | OUTPATIENT
Start: 2024-12-01

## 2024-12-12 NOTE — TELEPHONE ENCOUNTER
Returned call to patient after patient had called in tears yesterday. She reports she is having a really hard time with her Raynauds due to the freezing temperatures and her employer has not allowed her to move to the warmer area to work in. She is requesting a letter to be written by her provider that explains that she would benefit from working in the warmer area of the building due to her autoimmune disease. She was given a letter in 2023 from Dr. Contreras to excuse her from work intermittently due to her Raynauds. New letter pended per patient request and routed to provider to review, amend and sign for patient.    Ana Rodriguez RN

## 2024-12-16 ENCOUNTER — TELEPHONE (OUTPATIENT)
Dept: RHEUMATOLOGY | Facility: CLINIC | Age: 19
End: 2024-12-16
Payer: COMMERCIAL

## 2025-01-30 DIAGNOSIS — M35.1 MIXED CONNECTIVE TISSUE DISEASE: ICD-10-CM

## 2025-01-31 DIAGNOSIS — I73.00 RAYNAUD'S DISEASE WITHOUT GANGRENE: ICD-10-CM

## 2025-02-04 NOTE — TELEPHONE ENCOUNTER
hydroxychloroquine (PLAQUENIL) 200 MG tablet   Last Written Prescription Date:  10/25/2024  Last Fill Quantity: 90,   # refills: 0  Last Office Visit: 10/7/2024  [facility]  Future Office visit: none  Last Eye Exam:none found in chart    Routing hydroxychloroquine (PLAQUENIL) 200 MG tablet refill request to provider for review/approval because: failed Rheumatology refill protocol - no eye exam within the past 12 months  *no eye exam found in chart

## 2025-02-05 DIAGNOSIS — I73.00 RAYNAUD'S DISEASE WITHOUT GANGRENE: ICD-10-CM

## 2025-02-05 RX ORDER — NIFEDIPINE 10 MG/1
CAPSULE ORAL
Qty: 21 CAPSULE | Refills: 0 | OUTPATIENT
Start: 2025-02-05

## 2025-02-06 ENCOUNTER — MYC MEDICAL ADVICE (OUTPATIENT)
Dept: RHEUMATOLOGY | Facility: CLINIC | Age: 20
End: 2025-02-06
Payer: COMMERCIAL

## 2025-02-06 RX ORDER — HYDROXYCHLOROQUINE SULFATE 200 MG/1
200 TABLET, FILM COATED ORAL DAILY
Qty: 90 TABLET | Refills: 0 | Status: SHIPPED | OUTPATIENT
Start: 2025-02-06

## 2025-02-06 NOTE — TELEPHONE ENCOUNTER
Clarifying with patient if she has titrated evening dose to 20 mg before sending refills. Also want to clarify side effects reported at phone call.

## 2025-02-13 RX ORDER — NIFEDIPINE 10 MG/1
CAPSULE ORAL
Qty: 30 CAPSULE | Refills: 0 | Status: SHIPPED | OUTPATIENT
Start: 2025-02-13

## 2025-02-18 NOTE — TELEPHONE ENCOUNTER
"Call to patient to discuss nifedipine. Patient reports she stopped taking additional 10 mg last week due to continued dizziness. She states she does not have a way to check BP. She was unsure exactly of what dose she was taking but after further discussion, she believes she is currently taking 30 mg ER tablet daily. She denies dizziness on this dose and reports it helps her Raynauds \"some\". She reports she still does not have insurance coverage. Patient reports she is waiting for the state to reach out to her regarding insurance and says she has been waiting a month. Writer advised that patient follow up with them to check on status. Patient reports will reach out to schedule follow up with Dr. Ramos once insurance worked out.     Ana Rodriguez RN    "

## 2025-03-29 ENCOUNTER — OFFICE VISIT (OUTPATIENT)
Dept: URGENT CARE | Facility: URGENT CARE | Age: 20
End: 2025-03-29
Payer: COMMERCIAL

## 2025-03-29 VITALS
DIASTOLIC BLOOD PRESSURE: 72 MMHG | BODY MASS INDEX: 25.86 KG/M2 | OXYGEN SATURATION: 99 % | WEIGHT: 146 LBS | TEMPERATURE: 97.2 F | HEART RATE: 75 BPM | SYSTOLIC BLOOD PRESSURE: 106 MMHG | RESPIRATION RATE: 16 BRPM

## 2025-03-29 DIAGNOSIS — L03.032 PARONYCHIA OF TOE, LEFT: Primary | ICD-10-CM

## 2025-03-29 PROCEDURE — 3074F SYST BP LT 130 MM HG: CPT | Performed by: STUDENT IN AN ORGANIZED HEALTH CARE EDUCATION/TRAINING PROGRAM

## 2025-03-29 PROCEDURE — 99213 OFFICE O/P EST LOW 20 MIN: CPT | Performed by: STUDENT IN AN ORGANIZED HEALTH CARE EDUCATION/TRAINING PROGRAM

## 2025-03-29 PROCEDURE — 3078F DIAST BP <80 MM HG: CPT | Performed by: STUDENT IN AN ORGANIZED HEALTH CARE EDUCATION/TRAINING PROGRAM

## 2025-03-29 RX ORDER — CEPHALEXIN 500 MG/1
500 CAPSULE ORAL 3 TIMES DAILY
Qty: 15 CAPSULE | Refills: 0 | Status: SHIPPED | OUTPATIENT
Start: 2025-03-29 | End: 2025-04-03

## 2025-03-29 NOTE — PROGRESS NOTES
Assessment & Plan    Diagnosis Comments   1. Paronychia of toe, left  cephALEXin (KEFLEX) 500 MG capsule       Paronychia of left great toe. Will treat with cephalexin. Recommend soaking feet twice a day in warm soapy water. Hoa is trimming her nails too short and causing ingrown toe nails. Discussed leaving them longer to prevent this.  Watch for worsening infection of increased and spreading erythema, fever.  Should return to clinic if symptoms persist or become worse.      Dinora Galindo, MSN, FNP Student on 3/29/2025 at 4:06 PM     Patient was seen under my supervision with Dinora Galindo, NP student, I repeated physical examination of patient and plan for treatment and documentation reviewed with Dinora and with patient/family.    AJAY [facility] [place]    Hugo Camara is a 20 year old female who presents to clinic today for the following health issues:  Chief Complaint   Patient presents with    Urgent Care     Left toe infected x 2 days       HPI  Hoa is here due to possible left great toe infection. She noticed some erythema a week ago and two days ago her toe became very painful and she noticed some drainage. Her right toe is red as well but not painful. Denies fever, chills nausea.  She had a partial toe nail removal on both of her great toes at the end of January.          Review of Systems  Constitutional, HEENT, cardiovascular, pulmonary, gi and gu systems are negative, except as otherwise noted.      Objective    /72 (BP Location: Right arm, Patient Position: Sitting, Cuff Size: Adult Regular)   Pulse 75   Temp 97.2  F (36.2  C) (Tympanic)   Resp 16   Wt 66.2 kg (146 lb)   SpO2 99%   Breastfeeding No   BMI 25.86 kg/m    Physical Exam   GENERAL: alert and no distress  RESP: lungs clear to auscultation - no rales, rhonchi or wheezes  CV: regular rate and rhythm, normal S1 S2, no S3 or S4, no murmur, click or rub, no peripheral edema  MS:  no gross musculoskeletal defects noted, no edema  ORTHO: Left great toe erythema and edema at the base of the nail bed and medial side. Tenderness to palpation. Right great toe erythema CMS intact  PSYCH: mentation appears normal, affect normal/bright

## 2025-03-29 NOTE — PATIENT INSTRUCTIONS
Soak feet 2 times per day in warm soapy water  Do not cut toe nails shorter than the tip of the toe

## 2025-04-15 ENCOUNTER — TELEPHONE (OUTPATIENT)
Dept: FAMILY MEDICINE | Facility: CLINIC | Age: 20
End: 2025-04-15
Payer: COMMERCIAL

## 2025-04-15 NOTE — TELEPHONE ENCOUNTER
Medication Question or Refill    What medication are you calling about (include dose and sig)?:   cephALEXin (KEFLEX) 500 MG capsule    Preferred Pharmacy:   [id number] [address] AT [address] [address]  Phone: [phone] Fax: [phone]    Controlled Substance Agreement on file:   CSA -- Patient Level:    CSA: None found at the patient level.       Who prescribed the medication?: PCP    Do you need a refill? Yes    When did you use the medication last? NA    Patient offered an appointment? No    Do you have any questions or concerns?  Yes: Prescription marked as end on 4/03/25.      Could we send this information to you in [employer] or would you prefer to receive a phone call?:   Patient would prefer a phone call     Okay to leave a detailed message?: Yes at Home number on file [phone] (home)

## 2025-04-16 ENCOUNTER — OFFICE VISIT (OUTPATIENT)
Dept: URGENT CARE | Facility: URGENT CARE | Age: 20
End: 2025-04-16
Payer: COMMERCIAL

## 2025-04-16 VITALS
BODY MASS INDEX: 25.69 KG/M2 | TEMPERATURE: 98.1 F | WEIGHT: 145 LBS | OXYGEN SATURATION: 98 % | HEIGHT: 63 IN | DIASTOLIC BLOOD PRESSURE: 75 MMHG | HEART RATE: 74 BPM | SYSTOLIC BLOOD PRESSURE: 109 MMHG | RESPIRATION RATE: 19 BRPM

## 2025-04-16 DIAGNOSIS — L60.0 INGROWN TOENAIL: Primary | ICD-10-CM

## 2025-04-16 PROCEDURE — 3078F DIAST BP <80 MM HG: CPT | Performed by: PHYSICIAN ASSISTANT

## 2025-04-16 PROCEDURE — 3074F SYST BP LT 130 MM HG: CPT | Performed by: PHYSICIAN ASSISTANT

## 2025-04-16 PROCEDURE — 99213 OFFICE O/P EST LOW 20 MIN: CPT | Performed by: PHYSICIAN ASSISTANT

## 2025-04-16 NOTE — PROGRESS NOTES
Assessment & Plan     Ingrown toenail  Nail is growing in as expected at this point.  She does have mild infection present along the lateral nail fold.  Will treat with Augmentin course.  Recommend she soak more frequently than every other day.  She should follow-up with primary care provider if this is a persistent problem.  I did offer podiatry referral and she defers at this time.  - amoxicillin-clavulanate (AUGMENTIN) 875-125 MG tablet  Dispense: 20 tablet; Refill: 0             No follow-ups on file.    An Joseph PA-C  [facility] [place] Camara is a 20 year old female who presents to clinic today for the following health issues:  Chief Complaint   Patient presents with    Infection     Has not gotten better from last UC visit- toe still infected, pus draining yesterday. LEFT big toe.          4/16/2025    10:47 AM   Additional Questions   Roomed by dora   Accompanied by self     HPI patient is a 20-year-old female who presents to urgent care with left toenail concerns.  She had bilateral partial nail resections in January 2025.  It initially improved her symptoms however recently has noted increased discharge redness and swelling on the left side.  She was seen 3 weeks ago and given a 5-day course of Keflex.  5 days of Keflex mildly helpful but ongoing symptoms.    Warm soaks every other day.    Not cutting nails  since partial nail resection.    She is not currently interested in seeing podiatry for this.      Review of Systems  Constitutional, HEENT, cardiovascular, pulmonary, gi and gu systems are negative, except as otherwise noted.      Patient Active Problem List   Diagnosis    Persistent insomnia    PTSD (post-traumatic stress disorder)    Infection due to 2019 novel coronavirus    Raynaud disease    Mild episode of recurrent major depressive disorder    Recurrent fever    Shortened CT interval    Neutropenia    Ankylosis of joint of toe     Current  "Outpatient Medications   Medication Sig Dispense Refill    amoxicillin-clavulanate (AUGMENTIN) 875-125 MG tablet Take 1 tablet by mouth 2 times daily for 10 days. 20 tablet 0    hydrOXYzine (ATARAX) 25 MG tablet TAKE 1 TABLET(25 MG) BY MOUTH THREE TIMES DAILY AS NEEDED FOR ANXIETY (Patient not taking: Reported on 4/16/2025) 90 tablet 1    methylPREDNISolone (MEDROL DOSEPAK) 4 MG tablet therapy pack Follow Package Directions (Patient not taking: Reported on 4/16/2025) 21 tablet 0    naproxen (NAPROSYN) 500 MG tablet TAKE 1 TABLET(500 MG) BY MOUTH TWICE DAILY AS NEEDED FOR MODERATE PAIN (Patient not taking: Reported on 4/16/2025) 60 tablet 1    NIFEdipine ER (ADALAT CC) 30 MG 24 hr tablet Take 1 tablet (30 mg) by mouth daily. (Patient not taking: Reported on 4/16/2025) 90 tablet 1    nitroGLYcerin (NITRO-BID) 2 % OINT ointment Place 0.5 inches (7.5 mg) over 12 hours onto the skin every 24 hours. Apply to affected area. Do not use for 12 hours of each day. (Patient not taking: Reported on 4/16/2025) 30 g 3     No current facility-administered medications for this visit.       Objective    /75 (BP Location: Right arm, Patient Position: Sitting, Cuff Size: Adult Regular)   Pulse 74   Temp 98.1  F (36.7  C) (Oral)   Resp 19   Ht 1.6 m (5' 3\")   Wt 65.8 kg (145 lb)   LMP 04/02/2025 (Approximate)   SpO2 98%   BMI 25.69 kg/m    Physical Exam   Pt is in no acute distress and appears well  Semination of the left great toe reveals erythema and swelling along the lateral nail fold with some proud flesh and induration present.  He has mild tenderness to palpation here.  No current drainage noted.          "

## 2025-04-17 NOTE — TELEPHONE ENCOUNTER
Contacted [facility] and disregard Keflex refill  Patient was seen in  yesterday and received a new Rx for - amoxicillin-clavulanate (AUGMENTIN) 875-125 MG tablet  Dispense: 20 tablet; Refill: 0  No further action needed    Sharonda Moreno RN  [facility]    What medication are you calling about (include dose and sig)?:   cephALEXin (KEFLEX) 500 MG capsule